# Patient Record
Sex: MALE | Race: ASIAN | NOT HISPANIC OR LATINO | Employment: FULL TIME | URBAN - METROPOLITAN AREA
[De-identification: names, ages, dates, MRNs, and addresses within clinical notes are randomized per-mention and may not be internally consistent; named-entity substitution may affect disease eponyms.]

---

## 2017-09-29 ENCOUNTER — COMMUNICATION - HEALTHEAST (OUTPATIENT)
Dept: FAMILY MEDICINE | Facility: CLINIC | Age: 27
End: 2017-09-29

## 2018-01-03 ASSESSMENT — MIFFLIN-ST. JEOR
SCORE: 1560.63
SCORE: 1560.63

## 2018-01-04 ENCOUNTER — SURGERY - HEALTHEAST (OUTPATIENT)
Dept: SURGERY | Facility: HOSPITAL | Age: 28
End: 2018-01-04

## 2018-01-04 ENCOUNTER — ANESTHESIA - HEALTHEAST (OUTPATIENT)
Dept: SURGERY | Facility: HOSPITAL | Age: 28
End: 2018-01-04

## 2018-01-04 ENCOUNTER — RECORDS - HEALTHEAST (OUTPATIENT)
Dept: SURGERY | Facility: HOSPITAL | Age: 28
End: 2018-01-04

## 2018-01-05 ASSESSMENT — MIFFLIN-ST. JEOR
SCORE: 1607.63
SCORE: 1594.03

## 2018-01-11 ENCOUNTER — OFFICE VISIT - HEALTHEAST (OUTPATIENT)
Dept: FAMILY MEDICINE | Facility: CLINIC | Age: 28
End: 2018-01-11

## 2018-01-11 DIAGNOSIS — Z23 NEED FOR IMMUNIZATION AGAINST INFLUENZA: ICD-10-CM

## 2018-01-11 DIAGNOSIS — Z87.09 HISTORY OF PERITONSILLAR ABSCESS: ICD-10-CM

## 2018-01-11 ASSESSMENT — MIFFLIN-ST. JEOR: SCORE: 1566.81

## 2019-02-19 ENCOUNTER — COMMUNICATION - HEALTHEAST (OUTPATIENT)
Dept: CARE COORDINATION | Facility: CLINIC | Age: 29
End: 2019-02-19

## 2019-02-22 ENCOUNTER — OFFICE VISIT - HEALTHEAST (OUTPATIENT)
Dept: FAMILY MEDICINE | Facility: CLINIC | Age: 29
End: 2019-02-22

## 2019-02-22 DIAGNOSIS — J36 PERITONSILLAR ABSCESS: ICD-10-CM

## 2019-02-22 DIAGNOSIS — Z87.09 HISTORY OF PERITONSILLAR ABSCESS: ICD-10-CM

## 2019-02-22 DIAGNOSIS — Z09 HOSPITAL DISCHARGE FOLLOW-UP: ICD-10-CM

## 2019-02-22 DIAGNOSIS — Z23 NEED FOR IMMUNIZATION AGAINST INFLUENZA: ICD-10-CM

## 2019-02-22 ASSESSMENT — MIFFLIN-ST. JEOR: SCORE: 1464.75

## 2019-05-20 ENCOUNTER — COMMUNICATION - HEALTHEAST (OUTPATIENT)
Dept: FAMILY MEDICINE | Facility: CLINIC | Age: 29
End: 2019-05-20

## 2019-05-20 DIAGNOSIS — Z20.2 TRICHOMONAS CONTACT: ICD-10-CM

## 2019-05-22 ENCOUNTER — AMBULATORY - HEALTHEAST (OUTPATIENT)
Dept: NURSING | Facility: CLINIC | Age: 29
End: 2019-05-22

## 2019-05-22 ENCOUNTER — AMBULATORY - HEALTHEAST (OUTPATIENT)
Dept: FAMILY MEDICINE | Facility: CLINIC | Age: 29
End: 2019-05-22

## 2019-05-22 DIAGNOSIS — Z20.2 GONORRHEA CONTACT: ICD-10-CM

## 2019-05-22 DIAGNOSIS — Z20.2 CHLAMYDIA CONTACT: ICD-10-CM

## 2019-06-26 ENCOUNTER — OFFICE VISIT - HEALTHEAST (OUTPATIENT)
Dept: FAMILY MEDICINE | Facility: CLINIC | Age: 29
End: 2019-06-26

## 2019-06-26 DIAGNOSIS — Z20.2 EXPOSURE TO STD: ICD-10-CM

## 2019-06-26 DIAGNOSIS — Z87.898 HISTORY OF ALCOHOL USE: ICD-10-CM

## 2019-06-26 LAB
ALBUMIN SERPL-MCNC: 3.8 G/DL (ref 3.5–5)
ALP SERPL-CCNC: 106 U/L (ref 45–120)
ALT SERPL W P-5'-P-CCNC: 25 U/L (ref 0–45)
AST SERPL W P-5'-P-CCNC: 14 U/L (ref 0–40)
BILIRUB DIRECT SERPL-MCNC: 0.1 MG/DL
BILIRUB SERPL-MCNC: 0.3 MG/DL (ref 0–1)
HIV 1+2 AB+HIV1 P24 AG SERPL QL IA: NEGATIVE
PROT SERPL-MCNC: 7.6 G/DL (ref 6–8)

## 2019-06-26 ASSESSMENT — MIFFLIN-ST. JEOR: SCORE: 1510.11

## 2019-06-27 LAB
C TRACH DNA SPEC QL PROBE+SIG AMP: NEGATIVE
N GONORRHOEA DNA SPEC QL NAA+PROBE: NEGATIVE
T PALLIDUM AB SER QL: NEGATIVE

## 2019-07-10 ENCOUNTER — COMMUNICATION - HEALTHEAST (OUTPATIENT)
Dept: FAMILY MEDICINE | Facility: CLINIC | Age: 29
End: 2019-07-10

## 2019-07-10 DIAGNOSIS — Z20.2 CHLAMYDIA CONTACT: ICD-10-CM

## 2019-09-13 ENCOUNTER — OFFICE VISIT - HEALTHEAST (OUTPATIENT)
Dept: ADDICTION MEDICINE | Facility: HOSPITAL | Age: 29
End: 2019-09-13

## 2019-09-13 DIAGNOSIS — F15.20 METHAMPHETAMINE USE DISORDER, MODERATE (H): ICD-10-CM

## 2019-09-23 ENCOUNTER — OFFICE VISIT - HEALTHEAST (OUTPATIENT)
Dept: ADDICTION MEDICINE | Facility: HOSPITAL | Age: 29
End: 2019-09-23

## 2019-09-23 DIAGNOSIS — F15.20 METHAMPHETAMINE USE DISORDER, MODERATE (H): ICD-10-CM

## 2019-09-24 ENCOUNTER — OFFICE VISIT - HEALTHEAST (OUTPATIENT)
Dept: ADDICTION MEDICINE | Facility: HOSPITAL | Age: 29
End: 2019-09-24

## 2019-09-24 DIAGNOSIS — F15.20 METHAMPHETAMINE USE DISORDER, MODERATE (H): ICD-10-CM

## 2019-09-25 ENCOUNTER — AMBULATORY - HEALTHEAST (OUTPATIENT)
Dept: ADDICTION MEDICINE | Facility: HOSPITAL | Age: 29
End: 2019-09-25

## 2019-09-25 DIAGNOSIS — F15.20 METHAMPHETAMINE USE DISORDER, MODERATE (H): ICD-10-CM

## 2019-09-27 ENCOUNTER — AMBULATORY - HEALTHEAST (OUTPATIENT)
Dept: ADDICTION MEDICINE | Facility: HOSPITAL | Age: 29
End: 2019-09-27

## 2019-10-08 ENCOUNTER — AMBULATORY - HEALTHEAST (OUTPATIENT)
Dept: LAB | Facility: HOSPITAL | Age: 29
End: 2019-10-08

## 2019-10-08 ENCOUNTER — OFFICE VISIT - HEALTHEAST (OUTPATIENT)
Dept: ADDICTION MEDICINE | Facility: HOSPITAL | Age: 29
End: 2019-10-08

## 2019-10-08 DIAGNOSIS — F15.20 METHAMPHETAMINE USE DISORDER, MODERATE (H): ICD-10-CM

## 2019-10-08 LAB
AMPHETAMINES UR QL SCN: ABNORMAL
BARBITURATES UR QL: ABNORMAL
BENZODIAZ UR QL: ABNORMAL
CANNABINOIDS UR QL SCN: ABNORMAL
COCAINE UR QL: ABNORMAL
CREAT UR-MCNC: 5.7 MG/DL
ETHANOL UR CFM-MCNC: <10 MG/DL
METHADONE UR QL SCN: ABNORMAL
OPIATES UR QL SCN: ABNORMAL
OXYCODONE UR QL: ABNORMAL
PCP UR QL SCN: ABNORMAL

## 2019-10-10 ENCOUNTER — OFFICE VISIT - HEALTHEAST (OUTPATIENT)
Dept: ADDICTION MEDICINE | Facility: HOSPITAL | Age: 29
End: 2019-10-10

## 2019-10-10 DIAGNOSIS — F15.20 METHAMPHETAMINE USE DISORDER, MODERATE (H): ICD-10-CM

## 2019-10-11 ENCOUNTER — AMBULATORY - HEALTHEAST (OUTPATIENT)
Dept: ADDICTION MEDICINE | Facility: HOSPITAL | Age: 29
End: 2019-10-11

## 2019-10-12 LAB
AMPHET UR-MCNC: 280 NG/ML
MDA UR-MCNC: <200 NG/ML
MDEA UR-MCNC: <200 NG/ML
MDMA UR-MCNC: <200 NG/ML
METHAMPHET UR-MCNC: 2348 NG/ML
PHENTERMINE UR CFM-MCNC: <200 NG/ML

## 2019-10-15 ENCOUNTER — OFFICE VISIT - HEALTHEAST (OUTPATIENT)
Dept: ADDICTION MEDICINE | Facility: HOSPITAL | Age: 29
End: 2019-10-15

## 2019-10-15 DIAGNOSIS — F15.20 METHAMPHETAMINE USE DISORDER, MODERATE (H): ICD-10-CM

## 2019-10-17 ENCOUNTER — OFFICE VISIT - HEALTHEAST (OUTPATIENT)
Dept: ADDICTION MEDICINE | Facility: HOSPITAL | Age: 29
End: 2019-10-17

## 2019-10-17 ENCOUNTER — AMBULATORY - HEALTHEAST (OUTPATIENT)
Dept: ADDICTION MEDICINE | Facility: HOSPITAL | Age: 29
End: 2019-10-17

## 2019-10-17 DIAGNOSIS — F15.20 METHAMPHETAMINE USE DISORDER, MODERATE (H): ICD-10-CM

## 2019-10-22 ENCOUNTER — OFFICE VISIT - HEALTHEAST (OUTPATIENT)
Dept: ADDICTION MEDICINE | Facility: HOSPITAL | Age: 29
End: 2019-10-22

## 2019-10-22 DIAGNOSIS — F15.20 METHAMPHETAMINE USE DISORDER, MODERATE (H): ICD-10-CM

## 2019-10-25 ENCOUNTER — AMBULATORY - HEALTHEAST (OUTPATIENT)
Dept: ADDICTION MEDICINE | Facility: HOSPITAL | Age: 29
End: 2019-10-25

## 2019-10-29 ENCOUNTER — OFFICE VISIT - HEALTHEAST (OUTPATIENT)
Dept: ADDICTION MEDICINE | Facility: HOSPITAL | Age: 29
End: 2019-10-29

## 2019-10-29 DIAGNOSIS — F15.20 METHAMPHETAMINE USE DISORDER, MODERATE (H): ICD-10-CM

## 2019-10-31 ENCOUNTER — OFFICE VISIT - HEALTHEAST (OUTPATIENT)
Dept: ADDICTION MEDICINE | Facility: HOSPITAL | Age: 29
End: 2019-10-31

## 2019-10-31 ENCOUNTER — AMBULATORY - HEALTHEAST (OUTPATIENT)
Dept: LAB | Facility: HOSPITAL | Age: 29
End: 2019-10-31

## 2019-10-31 DIAGNOSIS — F15.20 METHAMPHETAMINE USE DISORDER, MODERATE (H): ICD-10-CM

## 2019-11-01 ENCOUNTER — AMBULATORY - HEALTHEAST (OUTPATIENT)
Dept: ADDICTION MEDICINE | Facility: HOSPITAL | Age: 29
End: 2019-11-01

## 2019-11-12 ENCOUNTER — OFFICE VISIT - HEALTHEAST (OUTPATIENT)
Dept: ADDICTION MEDICINE | Facility: HOSPITAL | Age: 29
End: 2019-11-12

## 2019-11-12 DIAGNOSIS — F15.20 METHAMPHETAMINE USE DISORDER, MODERATE (H): ICD-10-CM

## 2019-11-14 ENCOUNTER — OFFICE VISIT - HEALTHEAST (OUTPATIENT)
Dept: ADDICTION MEDICINE | Facility: HOSPITAL | Age: 29
End: 2019-11-14

## 2019-11-14 DIAGNOSIS — F15.20 METHAMPHETAMINE USE DISORDER, MODERATE (H): ICD-10-CM

## 2019-11-14 LAB
AMPHETAMINES UR QL SCN: ABNORMAL
BARBITURATES UR QL: ABNORMAL
BENZODIAZ UR QL: ABNORMAL
CANNABINOIDS UR QL SCN: ABNORMAL
COCAINE UR QL: ABNORMAL
CREAT UR-MCNC: 30.8 MG/DL
ETHANOL UR CFM-MCNC: <10 MG/DL
METHADONE UR QL SCN: ABNORMAL
OPIATES UR QL SCN: ABNORMAL
OXYCODONE UR QL: ABNORMAL
PCP UR QL SCN: ABNORMAL

## 2019-11-15 ENCOUNTER — AMBULATORY - HEALTHEAST (OUTPATIENT)
Dept: ADDICTION MEDICINE | Facility: HOSPITAL | Age: 29
End: 2019-11-15

## 2019-11-19 ENCOUNTER — OFFICE VISIT - HEALTHEAST (OUTPATIENT)
Dept: ADDICTION MEDICINE | Facility: HOSPITAL | Age: 29
End: 2019-11-19

## 2019-11-19 ENCOUNTER — AMBULATORY - HEALTHEAST (OUTPATIENT)
Dept: LAB | Facility: HOSPITAL | Age: 29
End: 2019-11-19

## 2019-11-19 DIAGNOSIS — M35.4: ICD-10-CM

## 2019-11-19 DIAGNOSIS — F15.20 METHAMPHETAMINE USE DISORDER, MODERATE (H): ICD-10-CM

## 2019-11-19 DIAGNOSIS — D72.18: ICD-10-CM

## 2019-11-19 LAB
AMPHETAMINES UR QL SCN: ABNORMAL
BARBITURATES UR QL: ABNORMAL
BENZODIAZ UR QL: ABNORMAL
CANNABINOIDS UR QL SCN: ABNORMAL
COCAINE UR QL: ABNORMAL
CREAT UR-MCNC: 14.9 MG/DL
ETHANOL UR CFM-MCNC: <10 MG/DL
METHADONE UR QL SCN: ABNORMAL
OPIATES UR QL SCN: ABNORMAL
OXYCODONE UR QL: ABNORMAL
PCP UR QL SCN: ABNORMAL

## 2019-11-22 ENCOUNTER — AMBULATORY - HEALTHEAST (OUTPATIENT)
Dept: ADDICTION MEDICINE | Facility: HOSPITAL | Age: 29
End: 2019-11-22

## 2019-11-24 LAB
AMPHET UR-MCNC: 487 NG/ML
MDA UR-MCNC: <200 NG/ML
MDEA UR-MCNC: <200 NG/ML
MDMA UR-MCNC: <200 NG/ML
METHAMPHET UR-MCNC: 2433 NG/ML
PHENTERMINE UR CFM-MCNC: <200 NG/ML

## 2019-11-26 ENCOUNTER — OFFICE VISIT - HEALTHEAST (OUTPATIENT)
Dept: ADDICTION MEDICINE | Facility: HOSPITAL | Age: 29
End: 2019-11-26

## 2019-11-26 DIAGNOSIS — F15.20 METHAMPHETAMINE USE DISORDER, MODERATE (H): ICD-10-CM

## 2019-11-27 ENCOUNTER — AMBULATORY - HEALTHEAST (OUTPATIENT)
Dept: ADDICTION MEDICINE | Facility: HOSPITAL | Age: 29
End: 2019-11-27

## 2019-12-03 ENCOUNTER — OFFICE VISIT - HEALTHEAST (OUTPATIENT)
Dept: ADDICTION MEDICINE | Facility: HOSPITAL | Age: 29
End: 2019-12-03

## 2019-12-03 DIAGNOSIS — F15.20 METHAMPHETAMINE USE DISORDER, MODERATE (H): ICD-10-CM

## 2019-12-05 ENCOUNTER — AMBULATORY - HEALTHEAST (OUTPATIENT)
Dept: ADDICTION MEDICINE | Facility: HOSPITAL | Age: 29
End: 2019-12-05

## 2019-12-12 ENCOUNTER — OFFICE VISIT - HEALTHEAST (OUTPATIENT)
Dept: ADDICTION MEDICINE | Facility: HOSPITAL | Age: 29
End: 2019-12-12

## 2019-12-12 DIAGNOSIS — F15.20 METHAMPHETAMINE USE DISORDER, MODERATE (H): ICD-10-CM

## 2019-12-13 ENCOUNTER — AMBULATORY - HEALTHEAST (OUTPATIENT)
Dept: ADDICTION MEDICINE | Facility: HOSPITAL | Age: 29
End: 2019-12-13

## 2019-12-19 ENCOUNTER — OFFICE VISIT - HEALTHEAST (OUTPATIENT)
Dept: ADDICTION MEDICINE | Facility: HOSPITAL | Age: 29
End: 2019-12-19

## 2019-12-19 DIAGNOSIS — F15.20 METHAMPHETAMINE USE DISORDER, MODERATE (H): ICD-10-CM

## 2019-12-31 ENCOUNTER — OFFICE VISIT - HEALTHEAST (OUTPATIENT)
Dept: ADDICTION MEDICINE | Facility: HOSPITAL | Age: 29
End: 2019-12-31

## 2019-12-31 ENCOUNTER — AMBULATORY - HEALTHEAST (OUTPATIENT)
Dept: LAB | Facility: HOSPITAL | Age: 29
End: 2019-12-31

## 2019-12-31 DIAGNOSIS — F15.20 METHAMPHETAMINE USE DISORDER, MODERATE (H): ICD-10-CM

## 2019-12-31 LAB
AMPHETAMINES UR QL SCN: ABNORMAL
BARBITURATES UR QL: ABNORMAL
BENZODIAZ UR QL: ABNORMAL
CANNABINOIDS UR QL SCN: ABNORMAL
COCAINE UR QL: ABNORMAL
CREAT UR-MCNC: 8.5 MG/DL
ETHANOL UR CFM-MCNC: <10 MG/DL
METHADONE UR QL SCN: ABNORMAL
OPIATES UR QL SCN: ABNORMAL
OXYCODONE UR QL: ABNORMAL
PCP UR QL SCN: ABNORMAL

## 2020-01-03 ENCOUNTER — AMBULATORY - HEALTHEAST (OUTPATIENT)
Dept: ADDICTION MEDICINE | Facility: HOSPITAL | Age: 30
End: 2020-01-03

## 2020-01-05 LAB
AMPHET UR-MCNC: 414 NG/ML
MDA UR-MCNC: <200 NG/ML
MDEA UR-MCNC: <200 NG/ML
MDMA UR-MCNC: <200 NG/ML
METHAMPHET UR-MCNC: 4113 NG/ML
PHENTERMINE UR CFM-MCNC: <200 NG/ML

## 2020-01-14 ENCOUNTER — AMBULATORY - HEALTHEAST (OUTPATIENT)
Dept: ADDICTION MEDICINE | Facility: HOSPITAL | Age: 30
End: 2020-01-14

## 2021-05-28 NOTE — TELEPHONE ENCOUNTER
I will send medication to pharmacy for patient. He does not need to be seen in clinic.    Leona John MD

## 2021-05-28 NOTE — TELEPHONE ENCOUNTER
Patient is scheduled to see Dr. Dietrich 5/22/2019 for positive trichomonas result in spouse (Vinay Holbrook). Patient was notified that he would need to be treated for Trich as well as his female partner. Dr. Samuel sent medication for spouse today to [facility]. Can MD send medication for Pah without the patient having to come on 5/22/19? Thank you.     If MD will send without seeing patient, please send medication for Trichomonas to [facility].

## 2021-05-30 NOTE — PROGRESS NOTES
"SUBJECTIVE  Pah SUZE Tatum is a 29 y.o. male here for:    Chief Complaint   Patient presents with     Follow-up     STD     His wife tested positive for trichomonas, gonorrhea and chlamydia. He was treated with flagyl, azithromycin and IM ceftriaxone 1 month ago. His wife was also treated. He is here today for recheck. He has not had intercourse with anyone since 1 month ago after he was treated. He denies any dysuria, hematuria, penile discharge or lesions.     Hx of heavy alcohol use previously- he reports he only drinks a few beers 1-2 x per month. Denies withdrawal symptoms. Denies other drug use.    ROS  Complete 10 point review of systems negative except as noted above in HPI    Reviewed Past Medical History, Medications, Family History and Social History in Epic and up to date with no new changes.    OBJECTIVE  /84   Pulse (!) 112   Temp 98  F (36.7  C) (Oral)   Resp 16   Ht 5' 6\" (1.676 m)   Wt 135 lb (61.2 kg)   SpO2 99%   BMI 21.79 kg/m       General: Cooperative, pleasant, in no acute distress  HEENT: NCAT, sclera clear  CV: RRR, normal S1/S2, no murmur, rubs, gallops  Resp: No respiratory distress. Clear to auscultation bilaterally. No wheezes, rales, rhonchi  Abd: Soft, non-tender, non-distended, BS normal. No hepatosplenomegaly  Psych: Normal mood and affect  : deferred per patient request.    LABS & IMAGES   Pending at time of note    ASSESSMENT/PLAN:   [place] was seen today for follow-up.    Diagnoses and all orders for this visit:    Exposure to STD: +chlamydia, +gonorrhea, +trich in his partner- he has been treated 1 month ago and denies intercourse since that time. Recheck today. Also will check HIV and syphilis. Discussed safe sex practices.  -     Chlamydia trachomatis & Neisseria gonorrhoeae, Amplified Detection  -     HIV Antigen/Antibody Screening [facility]  -     Syphilis Screen, [facility]    History of alcohol use: Previous heavy use- denies any heavy use currently. 1-2 beers about 1-2 " time per month. Denies other drug use. Denies any withdrawal symptoms. Will check baseline hepatic profile.  -     Hepatic Profile          Visit was completed along with Elva     Options for treatment and follow-up care were reviewed with the patient. Pah T Rc and/or guardian was engaged and actively involved in the decision making process. Pah T Rc and/or guardian verbalized understanding of the options discussed and was satisfied with the final plan.      Leona Samuel MD

## 2021-05-30 NOTE — TELEPHONE ENCOUNTER
The patient's partner tested positive for Chlamydia. Dr. Smith recommending treatment for Pah as well.     CAYDEN spoke with Pah's partner and she is requesting that MD send medication for him to [facility] on [address]. She does not want us to call him, but states that she would like to inform him personally and  medication together.     Please note that patient's preferred pharmacy is [facility]. Please send to [facility] on [address].

## 2021-05-31 VITALS — WEIGHT: 150 LBS | BODY MASS INDEX: 23.54 KG/M2 | HEIGHT: 67 IN

## 2021-05-31 VITALS — WEIGHT: 144 LBS | BODY MASS INDEX: 22.6 KG/M2 | HEIGHT: 67 IN

## 2021-06-01 NOTE — PROGRESS NOTES
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 (F18.20) (304.60)   []  Opioid Use Disorder [] Mild  [] Moderate  [] Severe [] (F11.10) (305.50)  [] (F11.20) (304.00)  [] (F11.20) (304.00)   []  Sedative, Hypnotic, or Anxiolytic Use Disorder [] Mild  [] Moderate  [] Severe [] (F13.10) (305.40)   [] (F13.20) (304.10)  [] (F13.20) (304.10)   [x]  Stimulant Related Disorders [] Mild  [x] Moderate  [] Severe [] (F15.10) (305.70) Amphetamine type substance  [] (F14.10) (305.60) Cocaine  [] (F15.10) (305.70) Other or unspecified stimulant  [x] (F15.20) (304.40) Amphetamine type substance  [] (F14.20) (304.20) Cocaine  [] (F15.20) (304.40) Other or unspecified stimulant  [] (F15.20) (304.40) Amphetamine type substance  [] (F14.20) (304.20) Cocaine  [] (F15.20) (304.40) Other or unspecified stimulant   []  Tobacco use Disorder [] Mild  [] Moderate  [] Severe [] (Z72.0) (305.1)  [] (F17.200) (305.1)  [] (F17.200) (305.1)   []  Other (or unknown) Substance Use Disorder [] Mild  [] Moderate  [] Severe [] (F19.10) (305.90)  [] (F19.20) (304.90)  [] (F19.20) (304.90)   /  Suggested Level of Care Necessary for Recovery  []  Inpatient  []  Extended Care []  Residential [x]  Outpatient  []  None    Collateral Contact Summary   Number of contacts made:  2  Contact with referring  person:  yes     If court related records were reviewed, summarize here:  ---   []   Information from collateral contacts supported/largely agreed with information from the client and associated risk ratings.   [x]   Information from collateral contacts was significantly different from information from the client and lead to different risk ratings.    Summarize new information here: Client has a violation hearing pending (11/01/19) related to current probation for drug possession in [facility] and has charges pending in [facility] for Prostitution and Felony Drug Possession with a trial date of 10/28/19..    Rule 25 Assessment Summary and Plan   's Recommendation    Based on the information gathered in this assessment and from collateral information, Client meets Criteria for Stimulant Use Disorder, moderate amphetamine type substance (F15.20)  -Outpatient treatment program.  -Follow recommendations of treatment program.  -Abstain from use of mood altering substances.  -Follow all requirements of probation.  -Remain law abiding.  This assessment can be updated with additional information within a 6 month period.      Collateral Contacts      Name    Jeffypaulie Wright Relationship     Phone Number    [phone] Releases    yes       Information Provided:    Left Message requesting a call back on 9/16 @ 0900, 9/18 @ 1045.  Received voicemail from  on 9/19/19: Client has a violation hearing pending (11/01/19) related to current probation for drug possession in [facility] and has charges pending in [facility] for Pro[facility] and Felony Drug Possession with a trial date of 10/28/19.    Collateral Contacts     Name    Neto Shook    [facility] Health Educator Phone Number    [phone] Releases    yes       Information Provided:    Verified information provided by the client during the assessment interview.     Staff Name and Title: Noah Fierro,  [facility]  Date:  9/13/2019  Time:  9:46 AM

## 2021-06-01 NOTE — PROGRESS NOTES
Weekly Progress Note  Rc[place]ia  1990  [id number]     D) Pt attended 2 groups this week with 0 absences. Patient attended 0 individual sessions this week. A) Staff facilitated groups and reviewed tx progress. Assessed for [facility]. R) No VAP needed at this time.   Any significant events, defines as events that impact patient s relationship with others inside and outside of treatment: No  Indicate any changes or monitoring of physical or mental health problems No     Indicate involvement by any outside supports No  IAPP reviewed and modified as needed. NA  Pt working on the following dimensions:     Dimension #1 - Withdrawal Potential - Risk 0. Client did not provide a definite last date of use. No signs or symptoms of intoxication or withdrawal noted or reported.   Specific goals from treatment plan addressed this week:  Patient to maintain abstinence throughout outpatient treatment.   Effectiveness of strategies:  Progressing: No Changes Noted.     Dimension #2 - Biomedical - Risk 0. The client reports he has no medical needs unmet and is able to obtain any necessary care on his own.   Specific goals from treatment plan addressed this week:  Patient to maintain stable health throughout outpatient treatment.  Effectiveness of strategies:  Progressing: Patient reports no issues in this area this week.     Dimension #3 - Emotional/Behavioral/Cognitive - Risk 0. No emotional or mental health problems noted or reported.     Specific goals from treatment plan addressed this week:  N/A  Effectiveness of strategies:  N/A     Dimension #4 - Treatment Acceptance/Resistance - Risk 2. The Client appears to lack insight into his substance use problems due in part to cultural barriers that interfere with his understanding of the need for treatment.   Specific goals from treatment plan addressed this week:  Client will increase motivation towards recovery by participating in outpatient programming.   Effectiveness of  "strategies:  Progressing: Patient attended all groups this week and participated in group discussion without prompting. Client stated during check in that he is sober today because he wants to be healthier and that he was grateful to be feeling better since he stopped drinking.     Dimension #5 - Relapse Potential - Risk 3. The client did not demonstrate any awareness of situations or emotions that trigger his drinking or display knowledge or understanding of the skills or coping mechanisms necessary to avoid those triggers.   Specific goals from treatment plan addressed this week:  Client will gain understanding and insight into situations and emotions that trigger his substance use.  Effectiveness of strategies:  Progressing: Client stated during check-in that he had experienced no thoughts or cravings to drink this week.     Dimension #6 - Recovery Environment - Risk 3. Client is not currently attending any community based recovery support groups and did not identify any reliable means of recovery support.  Specific goals from treatment plan addressed this week:  Client will explore and identify healthy sober supports in his community.  Effectiveness of strategies:  Progressing: Client did not attend the [facility] Community Support group last Saturday because he was not familiar with it. Client was given resources and information regarding the meeting and rides to and from.    T) Treatment plan updated: No. Patient notified and in agreement: NA.  Patient educated on \"Group Norms and Treatment Orientation  and  Reviewing Our Life Experiences . Client has completed 8 of 110 program hours at this time. Projected discharge date is 03/11/2020. Current discharge plan is not yet developed.     ALIA Zamarripa  9/27/2019, 12:03 PM    "

## 2021-06-01 NOTE — PROGRESS NOTES
Addiction Services - Initial Services Plan     Patient  Name: Virginia Tatum  MRN: [id number]   : 1990  Admit Date: 2019      Client describes their immediate need:  To learn recovery skills to prevent relapse. Comply with Probation    Are there any immediate Safety Needs such as (physical, stability, mobility): Client states he is able to get medical care as needed and denies any immediate concerns.     Immediate Health Needs and Plan:   Remain abstinent from substance use and attend first group therapy session on     Vulnerable Adult: No     Issues to be addressed in the first sessions:   Treatment planning, orientation to group norms and rules, and welcomed by peers.     Client strengths and needs:   Strengths identified as: Being a good .  Needs identified as: ESL classes, Service Coordination, comply with probation, monitor insurance.    Plan for patient for time between intake and completion of the treatment plan:   Attend all group therapy sessions as directed, complete all written and oral assignments as directed, and remain clean and sober. A relapse, if any, must be reported to staff immediately in order to ensure you are receiving the proper level of care. If you cannot attend a group therapy session you must call contact information provided in intake folder and leave a message before or during group hours.     Vulnerable Adult Review   [X] Review of the Facility Abuse Prevention plan was reviewed with the patient   [X] No Individual Abuse Plan is necessary   [ ] In addition to the Facility Abuse Prevention plan, an Individual Abuse Plan will be put in place     Staff Name/Title: ALIA Zamarripa   Date: 2019 Time: 1:00 PM

## 2021-06-01 NOTE — PROGRESS NOTES
Individual Treatment Plan    Patient  Name: Virginia Tatum  MRN: [id number]   : 1990  Admit Date: 19  Date of Initial Service Plan: 19  Tentative Discharge Date: 2020    Diagnosis: Stimulant Use Disorder, moderate amphetamine type substance (F15.20)    Counselor: ALIA Zamarripa  Dimension 1: Acute Intoxication/Withdrawal Potential, Risk level: 0    Problem Statement from Comprehensive Assessment:  Client did not provide a last date of use of alcohol, stating he could not remember. Client denied any history of methamphetamine use, but collateral sources indicate the client has a legal history of meth possession, including a pending felony possession charge. No signs or symptoms of intoxication or withdrawal noted or reported.   Problem: No signs or symptoms of intoxication or withdrawal noted or reported.   Goal: Patient to maintain abstinence throughout outpatient treatment.   Must be reached to complete treatment? Yes  Methods/Strategies (must include amount and frequency):   1. Patient to report any substance/alcohol use to counselor to determine if any changes need to be made to address withdrawal symptoms.   2. Patient to complete any requested UAs.   Target Date: 2020  Completion Date:       Dimension 2: Biomedical Conditions/Complications, Risk level: 0    Problem Statement from Comprehensive Assessment:  The client reports he has no medical needs unmet and is able to obtain any necessary care on his own.   Problem: The client reports he has no medical needs unmet and is able to obtain any necessary care on his own.  Goal: Patient to maintain stable health throughout outpatient treatment.   Must be reached to complete treatment? No  Methods/Strategies (must include amount and frequency):   1. Patient to report any changes to physical health to counselor.   2. Patient to attend all scheduled doctor s appointments.   3. Patient to take medications as prescribed.   Target Date:  03/11/2020  Completion Date:       Dimension 3: Emotional/Behavioral/Cognitive, Risk level: 0    Problem Statement from Comprehensive Assessment:  No emotional or mental health problems noted or reported. Client arrived to the [place] as a refugee in [place] and is currently a green card lieber[place] but speaks and understands limited English and is still in the process of acculturating.  Problem: No emotional or mental health problems noted or reported.    Goal: No Plan Needed at this time  Must be reached to complete treatment? N/A  Methods/Strategies (must include amount and frequency): N/A  Target Date: N/A  Completion Date:       Dimension 4: Readiness to Change, Risk level 2    Problem Statement from Comprehensive Assessment:  Client does not identify with having a substance use problem but did express willingness to attend treatment. Client provided information during his assessment interview that was markedly different from information obtained from collateral sources. The Client appears to lack insight into his substance use problems due in part to cultural barriers that interfere with his understanding of the need for treatment.   Problem: The Client appears to lack insight into his substance use problems due in part to cultural barriers that interfere with his understanding of the need for treatment.  Goal: Patient to increase motivation towards recovery by participating in outpatient programming.   Must be reached to complete treatment? Yes  Methods/Strategies (must include amount and frequency):   1. Patient to attend 2, 3-hour groups per week and all scheduled 1:1s.  2. Patient will contact staff before start time if unable to attend any scheduled group or appointment  Target Date: 03/11/2020  Completion Date:       Dimension 5: Relapse/Continued Use/Continued Problem Potential, Risk level: 3    Problem Statement from Comprehensive Assessment:  The client did not demonstrate any awareness of situations or emotions  that trigger his substance use or display any knowledge or understanding of the skills or coping mechanisms necessary to avoid those triggers. The Client lacks awareness and/or understanding of western concepts regarding substance use disorders due to language and cultural barriers.  Problem: The client did not demonstrate any awareness of situations or emotions that trigger his substance use or display any knowledge or understanding of the skills or coping mechanisms necessary to avoid those triggers.   Goal: To gain understanding of relapse triggers and stressors while learning coping skills in order to handle life events without resorting to substance use.  Must be reached to complete treatment? Yes  Methods/Strategies (must include amount and frequency):   Patient to share in daily check-in any urges and addictive thinking to better understand his pattern of use and to prevent relapse in the future.   Target Date: 03/11/2020  Completion Date:     Dimension 6: Recovery Environment, Risk level: 3    Problem Statement from Comprehensive Assessment:  The client stated he is Not Currently Employed or in any training program. The client is not currently attending any community based recovery support groups and did not identify any reliable means of recovery support. Client has a violation hearing pending related to current probation for drug possession in [facility] and has charges pending in [facility] for Prostitution and Felony Drug Possession.   Problem: Client is not currently attending any community based recovery support groups and did not identify any reliable means of recovery support.  Goal: Client will explore and identify healthy recovery supports in his community.  Must be reached to complete treatment? Yes  Methods/Strategies (must include amount and frequency): Client will attend 1 [facility] Community Support Group meeting per week (as able and available) and report back to counselor and  group on his experiences.  Target Date: 03/11/2020  Completion Date:     Resources  Resources to which the patient is being referred for problems when problems are to be addressed concurrently by another provider: None  By signing this document, I am acknowledging that I was actively and directly involved in the development of my treatment plan.     Patient  Signature_________________________________________         Date__________________     Staff Signature  [facility]    Date: 9/25/2019, 11:24 AM

## 2021-06-01 NOTE — PROGRESS NOTES
"Intake Note:     D) Virginia Tatum is a 29 y.o.  single  male who is referred to [facility] via [employer]OBATION with funding from [employer]. Patient orientated x 3. Patient meets criteria for Stimulant Use Disorder, moderate amphetamine type substance (F15.20).  Patient appears appropriate for OUTPATIENT TREATMENT.   A) Met with patient for 60 minutes.  Completed intake assessment and preliminary paperwork. Patient was given and explained counselor & supervisor license number and contact info, Patient Bill of Rights, program rules/regulations, Program Abuse Prevention Plan, confidentiality & HIPPA policies, grievance procedure, presented ROIs, TB & HIV/AIDS policies & resources, and Vulnerable Adult policy.   Conducted Vulnerable Adult Assessment.   R)No special Vulnerable Adult needed at this time.  Patient signed and agreed to counselor & supervisor license number and contact info, Patient Bill of Rights, group rules/regulations, Program Abuse Prevention Plan, confidentiality & HIPPA policies, grievance procedure,  ROIs, TB & HIV/AIDS policies & resources, and Vulnerable Adult policy. Patient scored LOW RISK on C-SSRS screen. Patient denied suicidal ideation/intent/plan/means at this time.     Opioid Use Disorder: No   Provided \"Options for Opioid Treatment in [place] and Overdose Prevention\" No     Dimension #1 - Withdrawal Potential - Risk 0. Client did not provide a last date of use of alcohol, stating he could not remember. Client denied any history of methamphetamine use, but collateral sources indicate the client has a legal history of meth possession, including a pending felony possession charge. No signs or symptoms of intoxication or withdrawal noted or reported.  Dimension #2 - Biomedical - Risk 0. The client reports he has no medical needs unmet and is able to obtain any necessary care on his own.   Dimension #3 - Emotional , Behavioral and Cognitive - Risk 0. No emotional or mental health " problems noted or reported.   Dimension #4 - Readiness for Change - Risk 2. Client does not identify with having a substance use problem but did express willingness to attend treatment. Client provided information during his assessment interview that was markedly different from information obtained from collateral sources. The Client appears to lack insight into his substance use problems due in part to cultural barriers that interfere with his understanding of the need for treatment.  Dimension #5 - Relapse Potential - Risk 3. The client did not demonstrate any awareness of situations or emotions that trigger his substance use or display any knowledge or understanding of the skills or coping mechanisms necessary to avoid those triggers. The Client lacks awareness and/or understanding of western concepts regarding substance use disorders due to language and cultural barriers.  Dimension #6 - Recovery Environment - Risk 3. The client stated he is Not Currently Employed or in any training program. The client is not currently attending any community based recovery support groups and did not identify any reliable means of recovery support. Client has a violation hearing pending related to current probation for drug possession in [facility] and has charges pending in [facility] for Pro[facility] and Felony Drug Possession.    T) Explained counselor & supervisor license number and contact info, Patient Bill of Rights, program rules/regulations, Program Abuse Prevention Plan, confidentiality & HIPPA policies, grievance procedure, presented ROIs, TB & HIV/AIDS policies & resources, and Vulnerable Adult policy. Patient expected to start group on 09/23/2019.    ALIA Zamarripa  9/24/2019, 12:17 PM

## 2021-06-01 NOTE — PROGRESS NOTES
Virginia Tatum attended 3 hours of group today.     The group topic was Reviewing Our Life Experiences, patient was responsive to topic.     Patients engagement in the group session: medium     Total group size: 7    ALIA Zamarripa  9/24/2019, 3:48 PM

## 2021-06-01 NOTE — PROGRESS NOTES
Virginia Tatum attended 4 hours of group today.     The group topic was group norms and treatment orientation, patient was responsive to topic.     Patients engagement in the group session: medium     Total group size: 3      ALIA Zamarripa  9/23/2019, 3:51 PM

## 2021-06-02 VITALS — WEIGHT: 125 LBS | HEIGHT: 66 IN | BODY MASS INDEX: 20.09 KG/M2

## 2021-06-02 NOTE — PROGRESS NOTES
Virginia Tatum attended 3 hours of group today.     The group topic was Goals and Recovery Part Two, patient was responsive to topic.     Patient's engagement in the group session: medium     Total group size: 6      ALIA Zamarripa  10/10/2019, 3:46 PM

## 2021-06-02 NOTE — PROGRESS NOTES
Weekly Progress Note  RcVirginia  1990  [id number]     D) Pt attended 2 groups this week with 0 absences. Patient attended 0 individual sessions this week. A) Staff facilitated groups and reviewed tx progress. Assessed for VA. R) No VAP needed at this time.   Any significant events, defines as events that impact patient s relationship with others inside and outside of treatment: No  Indicate any changes or monitoring of physical or mental health problems No     Indicate involvement by any outside supports No  IAPP reviewed and modified as needed. NA  Pt working on the following dimensions:     Dimension #1 - Withdrawal Potential - Risk 0. Client stated his last date of use as 10/05/19. No signs or symptoms of intoxication or withdrawal noted or reported.   Specific goals from treatment plan addressed this week:  Patient to maintain abstinence throughout outpatient treatment.   Effectiveness of strategies:  Progressing: Client reported his last date of use of Meth as 10/05/19. Client was asked to provid a UA sample. No signs or symptoms of intoxication or withdrawal noted or reported.    Dimension #2 - Biomedical - Risk 0. The client reports he has no medical needs unmet and is able to obtain any necessary care on his own.   Specific goals from treatment plan addressed this week:  Patient to maintain stable health throughout outpatient treatment.  Effectiveness of strategies:  Progressing: No Changes Noted.     Dimension #3 - Emotional/Behavioral/Cognitive - Risk 0. No emotional or mental health problems noted or reported.     Specific goals from treatment plan addressed this week:  N/A  Effectiveness of strategies:  N/A     Dimension #4 - Treatment Acceptance/Resistance - Risk 2. The Client appears to lack insight into his substance use problems due in part to cultural barriers that interfere with his understanding of the need for treatment.   Specific goals from treatment plan addressed this week:  Client will  "increase motivation towards recovery by participating in outpatient programming.   Effectiveness of strategies:  Progressing: The client attended both groups this week and participated in topic discussion without prompting. Client stated during check-in that he is sober today because he doesn't want to break the law any more and that he was grateful to have community resources that are helpful.     Dimension #5 - Relapse Potential - Risk 3. The client did not demonstrate any awareness of situations or emotions that trigger his drinking or display knowledge or understanding of the skills or coping mechanisms necessary to avoid those triggers.   Specific goals from treatment plan addressed this week:  Client will gain understanding and insight into situations and emotions that trigger his substance use.  Effectiveness of strategies:  Progressing: Client stated during check-in that he did not experience any thoughts or cravings for use this week.     Dimension #6 - Recovery Environment - Risk 3. Client is not currently attending any community based recovery support groups and did not identify any reliable means of recovery support.  Specific goals from treatment plan addressed this week:  Client will explore and identify healthy sober supports in his community.  Effectiveness of strategies:  Progressing: Client stated during check-in that he attended the [facility] Community Support group for the first time last Saturday.    T) Treatment plan updated: No. Patient notified and in agreement: NA.  Patient educated on \"Recognizing Stress  and  Stress Management . Client has completed 29 of 110 program hours at this time. Projected discharge date is 03/11/2020. Current discharge plan is not yet developed.     ALIA Zamarripa  11/1/2019, 1:27 PM  "

## 2021-06-02 NOTE — PROGRESS NOTES
Virginia Tatum attended 3 hours of group today.     The group topic was Stress Management, patient was responsive to topic.     Patient's engagement in the group session: medium     Total group size: 8      ALIA Zamarripa  10/29/2019, 3:48 PM

## 2021-06-02 NOTE — PROGRESS NOTES
Weekly Progress Note  Rc [place]  1990  [id number]     D) Pt attended 2 groups this week with 0 absences. Patient attended 0 individual sessions this week. A) Staff facilitated groups and reviewed tx progress. Assessed for [facility]. R) No VAP needed at this time.   Any significant events, defines as events that impact patient s relationship with others inside and outside of treatment: No  Indicate any changes or monitoring of physical or mental health problems No     Indicate involvement by any outside supports No  IAPP reviewed and modified as needed. NA  Pt working on the following dimensions:     Dimension #1 - Withdrawal Potential - Risk 0. Client stated his last date of use as 10/05/19. No signs or symptoms of intoxication or withdrawal noted or reported.   Specific goals from treatment plan addressed this week:  Patient to maintain abstinence throughout outpatient treatment.   Effectiveness of strategies:  Progressing: Client reported his last date of use of Meth as 10/05/19. Client provided a UA sample on 10/08/19 that tested positive for amphetamines. When informed of the positve result, the client admitted he had used Meth the previous weekend. No signs or symptoms of intoxication or withdrawal noted or reported.    Dimension #2 - Biomedical - Risk 0. The client reports he has no medical needs unmet and is able to obtain any necessary care on his own.   Specific goals from treatment plan addressed this week:  Patient to maintain stable health throughout outpatient treatment.  Effectiveness of strategies:  Progressing: Patient reports no issues in this area this week.     Dimension #3 - Emotional/Behavioral/Cognitive - Risk 0. No emotional or mental health problems noted or reported.     Specific goals from treatment plan addressed this week:  N/A  Effectiveness of strategies:  N/A     Dimension #4 - Treatment Acceptance/Resistance - Risk 2. The Client appears to lack insight into his substance use problems due  "in part to cultural barriers that interfere with his understanding of the need for treatment.   Specific goals from treatment plan addressed this week:  Client will increase motivation towards recovery by participating in outpatient programming.   Effectiveness of strategies:  Progressing: After missing both groups last week, the client attended both groups this week and participated in group discussion without prompting. Client stated during check in that he missed group because he had started a new job at night and was sleeping through group. Client stated he is sober today because he wants to stay out of trouble and that he was grateful to be sober.     Dimension #5 - Relapse Potential - Risk 3. The client did not demonstrate any awareness of situations or emotions that trigger his drinking or display knowledge or understanding of the skills or coping mechanisms necessary to avoid those triggers.   Specific goals from treatment plan addressed this week:  Client will gain understanding and insight into situations and emotions that trigger his substance use.  Effectiveness of strategies:  Progressing: Client reported his most recent date of use during check-in and participated in brief group discussion of relapse triggers.     Dimension #6 - Recovery Environment - Risk 3. Client is not currently attending any community based recovery support groups and did not identify any reliable means of recovery support.  Specific goals from treatment plan addressed this week:  Client will explore and identify healthy sober supports in his community.  Effectiveness of strategies:  Progressing: Client stated during check-in that he attended the [facility] group for the first time last Saturday and thought it was very helpful to listen to the experiences of other [facility].    T) Treatment plan updated: No. Patient notified and in agreement: NA.  Patient educated on \"Goals and Recovery . Client has " completed 14 of 110 program hours at this time. Projected discharge date is 03/11/2020. Current discharge plan is not yet developed.     ALIA Zamarripa  10/11/2019, 1:23 PM

## 2021-06-02 NOTE — PROGRESS NOTES
[place]ia Tatum attended 3 hours of group today.     The group topic was Recognizing Stress , patient was responsive to topic.     Patient's engagement in the group session: medium     Total group size: 8      ALIA Zamarripa  10/31/2019, 3:36 PM

## 2021-06-02 NOTE — PROGRESS NOTES
[place]ia Tatum attended 3 hours of group today.     The group topic was What Is Stopping You?, patient was responsive to topic.     Patient's engagement in the group session: medium     Total group size: 6      ALIA Zamarripa  10/15/2019, 3:28 PM

## 2021-06-02 NOTE — PROGRESS NOTES
Weekly Progress Note  Rc, [place]  1990  [id number]     D) Pt attended 1 groups this week with 1 absence. Patient attended 0 individual sessions this week. A) Staff facilitated groups and reviewed tx progress. Assessed for [facility]. R) No VAP needed at this time.   Any significant events, defines as events that impact patient s relationship with others inside and outside of treatment: No  Indicate any changes or monitoring of physical or mental health problems No     Indicate involvement by any outside supports No  IAPP reviewed and modified as needed. NA  Pt working on the following dimensions:     Dimension #1 - Withdrawal Potential - Risk 0. Client stated his last date of use as 10/05/19. No signs or symptoms of intoxication or withdrawal noted or reported.   Specific goals from treatment plan addressed this week:  Patient to maintain abstinence throughout outpatient treatment.   Effectiveness of strategies:  Progressing: Client reported his last date of use of Meth as 10/05/19. Client provided a UA sample on 10/08/19 that tested positive for amphetamines. When informed of the positve result, the client admitted he had used Meth the previous weekend. No signs or symptoms of intoxication or withdrawal noted or reported.    Dimension #2 - Biomedical - Risk 0. The client reports he has no medical needs unmet and is able to obtain any necessary care on his own.   Specific goals from treatment plan addressed this week:  Patient to maintain stable health throughout outpatient treatment.  Effectiveness of strategies:  Progressing: Patient reports no issues in this area this week.     Dimension #3 - Emotional/Behavioral/Cognitive - Risk 0. No emotional or mental health problems noted or reported.     Specific goals from treatment plan addressed this week:  N/A  Effectiveness of strategies:  N/A     Dimension #4 - Treatment Acceptance/Resistance - Risk 2. The Client appears to lack insight into his substance use problems due  "in part to cultural barriers that interfere with his understanding of the need for treatment.   Specific goals from treatment plan addressed this week:  Client will increase motivation towards recovery by participating in outpatient programming.   Effectiveness of strategies:  Progressing: The client attended both groups this week and participated in topic discussion without prompting. Client stated during check-in that he is sober today because he wants to stay out of trouble and that he was grateful that he isn't having cravings any more.     Dimension #5 - Relapse Potential - Risk 3. The client did not demonstrate any awareness of situations or emotions that trigger his drinking or display knowledge or understanding of the skills or coping mechanisms necessary to avoid those triggers.   Specific goals from treatment plan addressed this week:  Client will gain understanding and insight into situations and emotions that trigger his substance use.  Effectiveness of strategies:  Progressing: Client stated during check-in that he did not experience any thoughts or cravings for use this week.     Dimension #6 - Recovery Environment - Risk 3. Client is not currently attending any community based recovery support groups and did not identify any reliable means of recovery support.  Specific goals from treatment plan addressed this week:  Client will explore and identify healthy sober supports in his community.  Effectiveness of strategies:  Progressing: Client stated during check-in that he did not attend the [facility] Community Support group last Saturday because he forgot about it. Client has encouraged to set a reminder for himself.    T) Treatment plan updated: No. Patient notified and in agreement: NA.  Patient educated on \"Feelings in Addiction . Client has completed 23 of 110 program hours at this time. Projected discharge date is 03/11/2020. Current discharge plan is not yet developed.     Noah Fierro, " [facility]  10/25/2019, 11:57 AM

## 2021-06-02 NOTE — PROGRESS NOTES
[place]ia Tatum attended 3 hours of group today.     The group topic was Goals and Recovery, patient was responsive to topic.     Patients engagement in the group session: medium     Total group size: 6      ALIA Zamarripa  10/8/2019, 3:33 PM

## 2021-06-02 NOTE — PROGRESS NOTES
Weekly Progress Note  Rc [place]  1990  [id number]     D) Pt attended 2 groups this week with 0 absences. Patient attended 0 individual sessions this week. A) Staff facilitated groups and reviewed tx progress. Assessed for [facility]. R) No VAP needed at this time.   Any significant events, defines as events that impact patient s relationship with others inside and outside of treatment: No  Indicate any changes or monitoring of physical or mental health problems No     Indicate involvement by any outside supports No  IAPP reviewed and modified as needed. NA  Pt working on the following dimensions:     Dimension #1 - Withdrawal Potential - Risk 0. Client stated his last date of use as 10/05/19. No signs or symptoms of intoxication or withdrawal noted or reported.   Specific goals from treatment plan addressed this week:  Patient to maintain abstinence throughout outpatient treatment.   Effectiveness of strategies:  Progressing: Client reported his last date of use of Meth as 10/05/19. Client provided a UA sample on 10/08/19 that tested positive for amphetamines. When informed of the positve result, the client admitted he had used Meth the previous weekend. No signs or symptoms of intoxication or withdrawal noted or reported.    Dimension #2 - Biomedical - Risk 0. The client reports he has no medical needs unmet and is able to obtain any necessary care on his own.   Specific goals from treatment plan addressed this week:  Patient to maintain stable health throughout outpatient treatment.  Effectiveness of strategies:  Progressing: Patient reports no issues in this area this week.     Dimension #3 - Emotional/Behavioral/Cognitive - Risk 0. No emotional or mental health problems noted or reported.     Specific goals from treatment plan addressed this week:  N/A  Effectiveness of strategies:  N/A     Dimension #4 - Treatment Acceptance/Resistance - Risk 2. The Client appears to lack insight into his substance use problems due  "in part to cultural barriers that interfere with his understanding of the need for treatment.   Specific goals from treatment plan addressed this week:  Client will increase motivation towards recovery by participating in outpatient programming.   Effectiveness of strategies:  Progressing: The client attended both groups this week and participated in topic discussion without prompting. Client stated during check-in that he is sober today because he feels like it is time for him to stop and that he was grateful to be able to help a friend move.     Dimension #5 - Relapse Potential - Risk 3. The client did not demonstrate any awareness of situations or emotions that trigger his drinking or display knowledge or understanding of the skills or coping mechanisms necessary to avoid those triggers.   Specific goals from treatment plan addressed this week:  Client will gain understanding and insight into situations and emotions that trigger his substance use.  Effectiveness of strategies:  Progressing: Client reported his most recent date of use during check-in and participated in brief group discussion of relapse triggers.     Dimension #6 - Recovery Environment - Risk 3. Client is not currently attending any community based recovery support groups and did not identify any reliable means of recovery support.  Specific goals from treatment plan addressed this week:  Client will explore and identify healthy sober supports in his community.  Effectiveness of strategies:  Progressing: Client stated during check-in that he did not attend the [facility] Community Support group last Saturday because he was sleeping.    T) Treatment plan updated: No. Patient notified and in agreement: NA.  Patient educated on \"Recovery Thinking . Client has completed 20 of 110 program hours at this time. Projected discharge date is 03/11/2020. Current discharge plan is not yet developed.     Noah Fierro, [facility]  10/17/2019, 3:26 PM  "

## 2021-06-02 NOTE — PROGRESS NOTES
[place]ia Tatum attended 3 hours of group today.     The group topic was Recovery Thinking, patient was responsive to topic.     Patient's engagement in the group session: medium     Total group size: 7      ALIA Zamarripa  10/17/2019, 3:18 PM

## 2021-06-02 NOTE — PROGRESS NOTES
[place]ia Tatum attended 3 hours of group today.     The group topic was Feelings in Addiction, patient was responsive to topic.     Patient's engagement in the group session: medium     Total group size: 8      ALIA Zamarripa  10/22/2019, 3:26 PM

## 2021-06-03 VITALS — BODY MASS INDEX: 21.69 KG/M2 | WEIGHT: 135 LBS | HEIGHT: 66 IN

## 2021-06-03 NOTE — PROGRESS NOTES
Weekly Progress Note  Rc, [place]  1990  [id number]     D) Pt attended 1 groups this week with 1 excused absence. Patient attended 0 individual sessions this week. A) Staff facilitated groups and reviewed tx progress. Assessed for [facility]. R) No VAP needed at this time.   Any significant events, defines as events that impact patient s relationship with others inside and outside of treatment: No  Indicate any changes or monitoring of physical or mental health problems No     Indicate involvement by any outside supports No  IAPP reviewed and modified as needed. NA  Pt working on the following dimensions:     Dimension #1 - Withdrawal Potential - Risk 0. Client stated his last date of use as 11/06/19. No signs or symptoms of intoxication or withdrawal noted or reported.   Specific goals from treatment plan addressed this week:  Patient to maintain abstinence throughout outpatient treatment.   Effectiveness of strategies:  Progressing: Client reported his last date of use of Meth as 11/06/19. Client's urinalysis results from 11/14/19 were positive and the client was asked to provide another sample on 11/19/19.    Dimension #2 - Biomedical - Risk 0. The client reports he has no medical needs unmet and is able to obtain any necessary care on his own.   Specific goals from treatment plan addressed this week:  Patient to maintain stable health throughout outpatient treatment.  Effectiveness of strategies:  Progressing: No Changes Noted.     Dimension #3 - Emotional/Behavioral/Cognitive - Risk 0. No emotional or mental health problems noted or reported.     Specific goals from treatment plan addressed this week:  N/A  Effectiveness of strategies:  N/A     Dimension #4 - Treatment Acceptance/Resistance - Risk 2. The Client appears to lack insight into his substance use problems due in part to cultural barriers that interfere with his understanding of the need for treatment.   Specific goals from treatment plan addressed this  "week:  Client will increase motivation towards recovery by participating in outpatient programming.   Effectiveness of strategies:  Progressing: The client attended one group this week and participated in topic discussion without prompting. Client stated during check-in that he is sober today because he doesn't want to waste so much time and money and that he was grateful to be feeling happier without using.     Dimension #5 - Relapse Potential - Risk 3. The client did not demonstrate any awareness of situations or emotions that trigger his drinking or display knowledge or understanding of the skills or coping mechanisms necessary to avoid those triggers.   Specific goals from treatment plan addressed this week:  Client will gain understanding and insight into situations and emotions that trigger his substance use.  Effectiveness of strategies:  Progressing: Client stated during check-in that he did not experience any thoughts or cravings for use this week. Client shared his most recent date of use during check-in and participated in group discussion of consequences of continued use. .     Dimension #6 - Recovery Environment - Risk 3. Client is not currently attending any community based recovery support groups and did not identify any reliable means of recovery support.  Specific goals from treatment plan addressed this week:  Client will explore and identify healthy sober supports in his community.  Effectiveness of strategies:  Progressing: Client stated during check-in that he attended the [facility] Community Support group last Saturday and that he thinks he will be able to learn more in that group. Client was absent from group 11/21/19 due to having a court appearance.    T) Treatment plan updated: No. Patient notified and in agreement: NA.  Patient educated on \"What is Addiction? . Client has completed 44 of 110 program hours at this time. Projected discharge date is 03/11/2020. Current discharge plan is " not yet developed.     Noah Fierro, [facility]  11/22/2019, 12:39 PM

## 2021-06-03 NOTE — PROGRESS NOTES
[place]ia Tatum attended 3 hours of group today.     The group topic was Relapse Autopsy, patient was responsive to topic.     Patient's engagement in the group session: medium     Total group size: 7      ALIA Zamarripa  11/12/2019, 4:30 PM

## 2021-06-03 NOTE — PROGRESS NOTES
Weekly Progress Note  Rc[place]  1990  [id number]     D) Pt attended 1 groups this week with 0 absences. Patient attended 0 individual sessions this week. A) Staff facilitated groups and reviewed tx progress. Assessed for [facility]. R) No VAP needed at this time.   Any significant events, defines as events that impact patient s relationship with others inside and outside of treatment: No  Indicate any changes or monitoring of physical or mental health problems No     Indicate involvement by any outside supports No  IAPP reviewed and modified as needed. NA  Pt working on the following dimensions:     Dimension #1 - Withdrawal Potential - Risk 0. Client stated his last date of use of Meth as 11/06/19, and his last date of use of ETOH as 11/24/19. No signs or symptoms of intoxication or withdrawal noted or reported.   Specific goals from treatment plan addressed this week:  Patient to maintain abstinence throughout outpatient treatment.   Effectiveness of strategies:  Progressing: Client reported his last date of use  as 11/24/19. No signs or symptoms of intoxication or withdrawal noted or reported.     Dimension #2 - Biomedical - Risk 0. The client reports he has no medical needs unmet and is able to obtain any necessary care on his own.   Specific goals from treatment plan addressed this week:  Patient to maintain stable health throughout outpatient treatment.  Effectiveness of strategies:  Progressing: No Changes Noted.     Dimension #3 - Emotional/Behavioral/Cognitive - Risk 0. No emotional or mental health problems noted or reported.     Specific goals from treatment plan addressed this week:  N/A  Effectiveness of strategies:  N/A     Dimension #4 - Treatment Acceptance/Resistance - Risk 2. The Client appears to lack insight into his substance use problems due in part to cultural barriers that interfere with his understanding of the need for treatment.   Specific goals from treatment plan addressed this week:  Client  "will increase motivation towards recovery by participating in outpatient programming.   Effectiveness of strategies:  Progressing: The client attended one group this week and participated in topic discussion without prompting. Client stated during check-in that he is sober today because he wants to avoid trouble and that he was grateful to be able to stay off of Meth.     Dimension #5 - Relapse Potential - Risk 3. The client did not demonstrate any awareness of situations or emotions that trigger his drinking or display knowledge or understanding of the skills or coping mechanisms necessary to avoid those triggers.   Specific goals from treatment plan addressed this week:  Client will gain understanding and insight into situations and emotions that trigger his substance use.  Effectiveness of strategies:  Progressing: Client shared his most recent date of use during check-in and participated in group discussion of consequences of continued use.     Dimension #6 - Recovery Environment - Risk 3. Client is not currently attending any community based recovery support groups and did not identify any reliable means of recovery support.  Specific goals from treatment plan addressed this week:  Client will explore and identify healthy sober supports in his community.  Effectiveness of strategies:  Progressing: Client stated during check-in that he attended the [facility] Community Support group last Saturday.    T) Treatment plan updated: No. Patient notified and in agreement: NA.  Patient educated on \"Doctor's Presentation . Client has completed 47 of 110 program hours at this time. Projected discharge date is 03/11/2020. Current discharge plan is not yet developed.     ALIA Zamarripa  11/27/2019, 12:50 PM  "

## 2021-06-03 NOTE — PROGRESS NOTES
[place]ia Tatum attended 3 hours of group today.     The group topic was Healthy Family Communication, patient was responsive to topic.     Patient's engagement in the group session: medium     Total group size: 9      ALIA Zamarripa  11/14/2019, 3:36 PM

## 2021-06-03 NOTE — PROGRESS NOTES
[place]ia Tatum attended 2 hours of group today.     The group topic was Doctor's Presentation, patient was responsive to topic.     Patient's engagement in the group session: medium     Total group size: 6      ALIA Zamarripa  11/26/2019, 3:05 PM

## 2021-06-03 NOTE — PROGRESS NOTES
Virginia Tatum attended 3 hours of group today.     The group topic was Jails, Hospitals and Death, patient was responsive to topic.     Patient's engagement in the group session: Medium    Total group size: 7      ALIA Zamarripa  12/3/2019, 3:55 PM

## 2021-06-03 NOTE — PROGRESS NOTES
[place]ia Tatum attended 3 hours of group today.     The group topic was What is Addiction?, patient was responsive to topic.     Patient's engagement in the group session: medium     Total group size: 7      ALIA Zamarripa  11/19/2019, 3:38 PM

## 2021-06-03 NOTE — PROGRESS NOTES
Weekly Progress Note  [place]  1990  [id number]     D) Pt attended 2 groups this week with 0 absences. Patient attended 0 individual sessions this week. A) Staff facilitated groups and reviewed tx progress. [facility] for [facility]. R) No VAP needed at this time.   Any significant events, defines as events that impact patient s relationship with others inside and outside of treatment: No  Indicate any changes or monitoring of physical or mental health problems No     Indicate involvement by any outside supports No  IAPP reviewed and modified as needed. NA  Pt working on the following dimensions:     Dimension #1 - Withdrawal Potential - Risk 0. Client stated his last date of use as 11/06/19. No signs or symptoms of intoxication or withdrawal noted or reported.   Specific goals from treatment plan addressed this week:  Patient to maintain abstinence throughout outpatient treatment.   Effectiveness of strategies:  Progressing: Client reported his last date of use of Meth as 11/06/19. Client was reminded that abstinence is an expectation of this treatment program and asked to provid a UA sample. No signs or symptoms of intoxication or withdrawal noted or reported.    Dimension #2 - Biomedical - Risk 0. The client reports he has no medical needs unmet and is able to obtain any necessary care on his own.   Specific goals from treatment plan addressed this week:  Patient to maintain stable health throughout outpatient treatment.  Effectiveness of strategies:  Progressing: No Changes Noted.     Dimension #3 - Emotional/Behavioral/Cognitive - Risk 0. No emotional or mental health problems noted or reported.     Specific goals from treatment plan addressed this week:  N/A  Effectiveness of strategies:  N/A     Dimension #4 - Treatment Acceptance/Resistance - Risk 2. The Client appears to lack insight into his substance use problems due in part to cultural barriers that interfere with his understanding of the need for treatment.  "  Specific goals from treatment plan addressed this week:  Client will increase motivation towards recovery by participating in outpatient programming.   Effectiveness of strategies:  Progressing: The client attended both groups this week and participated in topic discussion without prompting. Client stated during check-in that he is sober today because he is starting to feel better without using and that he was grateful to be sober.     Dimension #5 - Relapse Potential - Risk 3. The client did not demonstrate any awareness of situations or emotions that trigger his drinking or display knowledge or understanding of the skills or coping mechanisms necessary to avoid those triggers.   Specific goals from treatment plan addressed this week:  Client will gain understanding and insight into situations and emotions that trigger his substance use.  Effectiveness of strategies:  Progressing: Client stated during check-in that he did not experience any thoughts or cravings for use this week. Client shared his most recent date of use during check-in and participated in group discussion of his triggers for use. Client appeared to accept feedback from his counselor and peers.     Dimension #6 - Recovery Environment - Risk 3. Client is not currently attending any community based recovery support groups and did not identify any reliable means of recovery support.  Specific goals from treatment plan addressed this week:  Client will explore and identify healthy sober supports in his community.  Effectiveness of strategies:  Progressing: Client stated during check-in that he did not attend the [facility] Community Support group last Saturday because he was feeling sick. Client also reported he has court in [facility] on 11/21/19 and will not be in group that day.    T) Treatment plan updated: No. Patient notified and in agreement: NA.  Patient educated on \"Relapse Autopsy  and  Healthy Family Communication . Client has " completed 35 of 110 program hours at this time. Projected discharge date is 03/11/2020. Current discharge plan is not yet developed.     ALIA Zamarripa  11/15/2019, 2:33 PM

## 2021-06-04 NOTE — PROGRESS NOTES
Virginia Tatum attended 3 hours of group today.     The group topic was What Is Stopping You?, patient was responsive to topic.     Patient's engagement in the group session: medium     Total group size: 6      ALIA Zamarripa  12/31/2019, 3:34 PM

## 2021-06-04 NOTE — PROGRESS NOTES
Virginia Tatum attended 2 hours of group today.     The group topic was The Power of Positive Thinking, patient was responsive to topic.     Patient's engagement in the group session: low     Client was asked to give a urinalysis sample after group but left group early without giving an explanation and did not submit a sample.    Total group size: 6      No[facility] Fierro [facility]  12/19/2019, 3:50 PM
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
## 2021-06-04 NOTE — PROGRESS NOTES
Weekly Progress Note  Virginia Tatum  1990  [id number]     D) Pt attended 1 groups this week with 1 absence. Patient attended 0 individual sessions this week. A) Staff facilitated groups and reviewed tx progress. Assessed for [facility]. R) No VAP needed at this time.   Any significant events, defines as events that impact patient s relationship with others inside and outside of treatment: No  Indicate any changes or monitoring of physical or mental health problems No     Indicate involvement by any outside supports No  IAPP reviewed and modified as needed. NA  Pt working on the following dimensions:     Dimension #1 - Withdrawal Potential - Risk 0. Client stated his last date of use of Meth as 11/06/19, and his last date of use of ETOH as 11/24/19. No signs or symptoms of intoxication or withdrawal noted or reported.   Specific goals from treatment plan addressed this week:  Patient to maintain abstinence throughout outpatient treatment.   Effectiveness of strategies:  Progressing: Client stated no use this week. No signs or symptoms of intoxication or withdrawal noted or reported.     Dimension #2 - Biomedical - Risk 0. The client reports he has no medical needs unmet and is able to obtain any necessary care on his own.   Specific goals from treatment plan addressed this week:  Patient to maintain stable health throughout outpatient treatment.  Effectiveness of strategies:  Progressing: No Changes Noted.     Dimension #3 - Emotional/Behavioral/Cognitive - Risk 0. No emotional or mental health problems noted or reported.     Specific goals from treatment plan addressed this week:  N/A  Effectiveness of strategies:  N/A     Dimension #4 - Treatment Acceptance/Resistance - Risk 2. The Client appears to lack insight into his substance use problems due in part to cultural barriers that interfere with his understanding of the need for treatment.   Specific goals from treatment plan addressed this week:  Client will increase  "motivation towards recovery by participating in outpatient programming.   Effectiveness of strategies:  Progressing: The client attended one group this week and participated in topic discussion without prompting. Client stated during check-in that he is sober today because using isn't worth the trouble it causes and that he was grateful for the snow.     Dimension #5 - Relapse Potential - Risk 3. The client did not demonstrate any awareness of situations or emotions that trigger his drinking or display knowledge or understanding of the skills or coping mechanisms necessary to avoid those triggers.   Specific goals from treatment plan addressed this week:  Client will gain understanding and insight into situations and emotions that trigger his substance use.  Effectiveness of strategies:  Progressing: Client stated during check-in that he had experienced no thoughts or cravings to drink this week.     Dimension #6 - Recovery Environment - Risk 3. Client is not currently attending any community based recovery support groups and did not identify any reliable means of recovery support.  Specific goals from treatment plan addressed this week:  Client will explore and identify healthy sober supports in his community.  Effectiveness of strategies:  Progressing: Client stated during check-in that he did not attend the [facility] Community Support group last Saturday. Client reported he had court this week and has jury trial scheduled in January for 2 charges in [facility]. Client agreed to bring contact information for his  and sign a release of information.    T) Treatment plan updated: No. Patient notified and in agreement: NA.  Patient educated on \"What is Recovery? . Client has completed 47 of 110 program hours at this time. Projected discharge date is 03/11/2020. Current discharge plan is not yet developed.     ALIA Zamarripa  12/13/2019, 12:39 PM  "
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
## 2021-06-04 NOTE — PROGRESS NOTES
Virginia Tatum attended 3 hours of group today.     The group topic was What is Recovery?, patient was responsive to topic.     Patient's engagement in the group session: medium     Total group size: 6      ALIA Zamarripa  12/12/2019, 3:35 PM

## 2021-06-05 NOTE — PROGRESS NOTES
OUTPATIENT SUBSTANCE USE DISORDER TREATMENT  DISCHARGE SUMMARY    Name:  Virginia Tatum :  ALIA Zamarripa   Admit Date: 2019 Discharge Date: 2020   :  1990 Hours Completed: 52   Initial Diagnosis:  Stimulant Use Disorder, moderate amphetamine type substance (F15.20) Final Diagnosis:  Stimulant Use Disorder, moderate amphetamine type substance (F15.20)   Discharge Address:    [address] [place] [address] Funding Source:    [employer]     Discharge Type:  Absent Without Leave (AWOL)     Client was receiving residential services at the time of discharge:   No      Reasons for and circumstances of service termination:  Virginia Tatum  is a 29 y.o. year-old male who admitted to [facility] on 19 and has completed 52 hours as of 2020. Client is being discharged AWOL after missing 3 consecutive scheduled groups and failing to make contact with his counselor to explain his absence. Client's peers have reported on at least 5 occasions that the client was present in the building prior to group start times but did not attend.  Client received Basic Education on group rules and norms and treatment orientation, and information on the following topics; Feelings in Addiction, Stress Management, What is Addiction?, Jails, Hospitals and Death, Reviewing Our Life Experiences, What is Recovery?, What Is Stopping You?, Thinking Comes Before Drinking, Doctor's Presentation, Recognizing Stress and Handling Stress, Goals and Recovery, Recovery Thinking, Relapse Autopsy, and Healthy Family Communication.      If program discharge status was At Staff Request, the license lieberman must identify the following:     Other interested parties conferred with: N/A  Referrals provided: N/A  Alternatives considered and attempted before deciding to discharge: N/A     Dimension/Course of Treatment/Individualized Care:   1. Withdrawal Potential - Intake Risk level - 0  Discharge Risk level - UNABLE TO ASSESS  Narrative supporting risk description:  No signs of intoxication or withdrawal noted or reported.   Treatment plan goals and progress towards those goals:  Goal: The Client s reported last date of use is 11/24/19, but provided a urine sample that tested positive for Methamphetamine on 12/31/19.   2. Biomedical Conditions and Complications - Intake Risk level - 0 Discharge Risk level - UNABLE TO ASSESS  Narrative supporting risk description:  The client reported he had no medical needs unmet and was able to obtain any necessary care on his own.   Treatment plan goals and progress towards those goals:  Goal: Patient to maintain stable health throughout outpatient treatment.   No Changes noted during treatment.     3. Emotional/Behavioral/Cognitive Conditions and Complications - Intake Risk level - 0 Discharge Risk level - UNABLE TO ASSESS  Narrative supporting risk description:  Client is of [place] ethnicity, nationality from [facility]. He does not speak any English.   Treatment plan goals and progress towards those goals:  Goal: Maintain Stability of Mental Health.  Client participated in groups inconsistently when attending, sometimes minimally. The Client did not attend any groups or contact his counselor for 3 consecutive weeks.    4. Readiness for Change - Intake Risk level - 2 Discharge Risk level - UNABLE TO ASSESS  Narrative supporting risk description:  On admission, the client appeared to lack insight into his drinking problems due in part to language and cultural barriers that interfered with his understanding of the need for treatment. Client stated his motivation for attending treatment was to comply with probation.  Treatment plan goals and progress towards those goals:  Goal: Patient to increase motivation towards recovery by participating in outpatient programming.   Client participated inconsistently in group, with and without active prompting. Client offered  limited feedback to program peers and reported little outside support group attendance, in spite of free rides to and from the support group being provided. The Client did not attend any groups or contact his counselor for 3 consecutive weeks and provided a urine sample that tested positive for Methamphetamine on 12/31/19.   5. Relapse/Continued Use/Continued Problem Potential - Intake Risk level - 3 Discharge Risk level - UNABLE TO ASSESS  Narrative supporting risk description:  Client had identified no significant periods of abstinence from mood-altering substances at time of admission. Client had no previous outpatient treatment episode at time of program admission.   On admission the client did not demonstrate any awareness of situations or emotions that triggered his substance use or display any knowledge or understanding of the skills or coping mechanisms necessary to avoid those triggers. The Client lacked awareness and understanding of western concepts regarding substance use disorders due to language and cultural barriers.  Treatment plan goals and progress towards those goals:  Goal: Client will gain insight into situations and emotions that trigger his drinking.  Client was inconsistently active program participant during treatment but did participate in group discussion of relapse triggers and coping skills when confronted about his continued use.   6.  Recovery Environment - Intake Risk level -  3 Discharge Risk level - UNABLE TO ASSESS  Narrative supporting risk description:  On admission, the client reported he was not employed or involved in any training program and was living with his family. Client reported his family was supportive of his treatment. The client was not involved in any volunteer activities and was not participating in any community based recovery support groups. Client had a probation violation hearing pending related to current probation for drug possession in [facility] and  had charges pending in [facility] for Prostitution and Felony Drug Possession.  Treatment plan goals and progress towards those goals:  Goal: Client will explore and identify healthy sober supports in his community.  Client reported only one instance of attendance at community support groups in spite of repeated reminders and transportation resources.    Strengths: The only strength the client would identify was being a good .  Needs: ESL Classes; Ongoing Recovery Support  Services Provided: Intake; assessment; treatment planning; Group Therapy; Psychoeducation; Service Coordination; lectures; 1x1 therapy; and recommendations at discharge.      Program Involvement:                Fair  Attendance:                                Poor  Ability to relate in group/               Other program activities:            Poor  Assignment Completion:             Poor  Overall Behavior:                        Fair  Reported Family/Significant          Other Involvement:                     N/A     Prognosis:                                   Poor     Recommendations                                                                                                        Client is recommended to abstain from the use of any mood altering substances, remain law abiding, obtain and follow the recommendations of a new chemical health assessment, Attend [facility] Community Support Group, Identify and Maintain a Social Network of Sober Friends and to follow requirements of probation.    Mental Health Referral  Mental Health Evaluation    Physical Health Referral:    Primary Care Physician: Leona Samuel MD     Counselor Name and Title:  ALIA Zamarripa      Date:  1/14/2020  Time:  12:01 PM

## 2021-06-15 NOTE — ANESTHESIA POSTPROCEDURE EVALUATION
Patient: Virginia Tatum  AWAKE INTUBATION INCISION AND DRAINAGE OF PERITONSILAR ABCESS  Anesthesia type: general    Patient location: PACU  Last vitals:   Vitals:    01/04/18 0220   BP: (!) 87/52   Pulse: 94   Resp: 14   Temp:    SpO2: 100%     Post vital signs: stable  Level of consciousness: sedated  Post-anesthesia pain: pain controlled  Post-anesthesia nausea and vomiting: no  Pulmonary: ETT  Cardiovascular: stable  Hydration: adequate  Anesthetic events: no    QCDR Measures:  ASA# 11 - Erna-op Cardiac Arrest: ASA11B - Patient did NOT experience unanticipated cardiac arrest  ASA# 12 - Erna-op Mortality Rate: ASA12B - Patient did NOT die  ASA# 13 - PACU Re-Intubation Rate: ASA13X - Exclusion: organ donor or direct ICU transfer  ASA# 10 - Composite Anes Safety: ASA10A - No serious adverse event    Additional Notes: Patient with controlled urgent intubation d/t tracheal deviation and upper airway narrowing from pharyngeal abscess. Patient to remain intubation overnight.

## 2021-06-15 NOTE — ANESTHESIA PROCEDURE NOTES
Emergent Intubation  Date/Time: 1/4/2018 1:31 AM  Anesthesiologist: JAKE CHOI  Indications: respiratory distress (peritonsillar abscess, narrowing of upper airway to <1cm)  Route: oral  Technique: fiberoptic assisted  Tube size: 7.0 mm  Tube type: cuffed  Cuff inflated: yes  ETT to lip: 24 cm  Tube secured with: adhesive tape and ETT lieberman  ETCO2 = Yes  Breath sounds: equal  Comments: Controlled intubation in OR. Glyco 0.4mg and lidocaine neb given prior to awake fiberoptic. 8mL 4% lidocaine topically given to numb the upper airway. Successfully intubated.

## 2021-06-15 NOTE — ANESTHESIA PREPROCEDURE EVALUATION
Anesthesia Evaluation      Patient summary reviewed   No history of anesthetic complications     Airway   Mallampati: IV  Neck ROM: limited   Pulmonary - normal exam     ROS comment: Peritonsillar abscess                         Cardiovascular - negative ROS and normal exam   Neuro/Psych - negative ROS     Endo/Other - negative ROS      GI/Hepatic/Renal - negative ROS      Other findings: CT neck:       CONCLUSION:   1. There is a large 2.9 x 2.8 x 3.7 cm left palatine tonsillar/peritonsillar abscess. This results in considerable narrowing of the airway at this level. Inflammatory changes and thickening extend along the left pharyngeal mucosa with effacement of the  left vallecula, thickening of the left aspect of the epiglottis and aryepiglottic fold.    2. A 0.4 x 0.8 cm peripherally enhancing centrally hypodense structure anterior to the left internal jugular vein may represent a small venous branch with incomplete contrast filling. Less likely, this could represent a small necrotic lymph node.     3. A mildly enlarged left level 2A lymph node is presumably reactive.     Limited neck extension  Sedated  Mouthing opening ~3cm        Dental                         Anesthesia Plan  Planned anesthetic: general endotracheal  Awake intubation  ASA 4 - emergent   Induction: intravenous   Anesthetic plan and risks discussed with: patient  Anesthesia plan special considerations: increased risk of difficult airway,   Post-op plan: extended intubation/vent support

## 2021-06-15 NOTE — PROGRESS NOTES
"SUBJECTIVE  Virginia Tatum is a 27 y.o. male here for follow-up and to establish care:    Peritonsillar abscess: Admitted 1/3 and discharged 1/6/18. He developed respiratory failure secondary to oral secretions, requiring intubation and underwent I&D by ENT of peritonsilar abscess. He was treated with IV zosyn and then discharged on clindamycin (through 1/16/18). He was taking clindamycin and ibuprofen. However, he then developed bleeding and throat pain this morning and went to ED. Lab work was done which showed no significant anemia. He was given nebulized epinephrine and lidocaine with epi with cessation of bleeding. Repeat imaging showed decrease in size of previous peritonsillar abscess with some possible active bleeding. ENT was consulted and recommended dilute peroxide solution which stopped his bleeding. He was advised to stop clindamycin and ibpurofen and start omnicef and liquid roxicodone to help with pain. He is here today for follow-up. He reports he is able to swallow but having difficulty with solid foods. Tolerating liquids without difficulty. Denies any further bleeding or purulent discharge. No neck swelling or fever/chills.     Health maintenance: due for flu    Tobacco use: 1-2 cigarettes per day, has cut back significantly. Precontemplative about quitting at this time.     ROS  Complete 10 point review of systems negative except as noted above in HPI    Past Medical History  Peritonsillar abscess s/p I&D    Past Surgical History:  None    Medications:  Omnicef  Roxicodone prn    Family History:  Parents- healthy  Siblings- healthy    Social History:  Tobacco- 1-2 cigs per day  EtOH: occasional  Drug: denies    OBJECTIVE  /78 (Patient Site: Right Arm, Patient Position: Sitting, Cuff Size: Adult Regular)  Pulse 92  Temp 97.9  F (36.6  C) (Oral)   Resp 16  Ht 5' 7\" (1.702 m)  Wt 144 lb (65.3 kg)  BMI 22.55 kg/m2     General: Cooperative, pleasant, in no acute distress  HEENT: NCAT, L " peritonsilar region with overlying scab, hemostatic.   Neck: no lymphadenopathy, no masses  CV: RRR, normal S1/S2, no murmur, rubs, gallops  Resp: No respiratory distress. Clear to auscultation bilaterally. No wheezes, rales, rhonchi  Ext: moves all extremities equally  MSK: strength grossly intact  Neuro: no focal deficits  Skin: no rashes  Psych: normal mood and affect    LABS & IMAGES   Results for orders placed or performed during the hospital encounter of 01/11/18   Type and Screen   Result Value Ref Range    ABORh O POS     Antibody Screen Negative Negative   HM2(CBC w/o Differential)   Result Value Ref Range    WBC 12.1 (H) 4.0 - 11.0 thou/uL    RBC 5.28 4.40 - 6.20 mill/uL    Hemoglobin 15.1 14.0 - 18.0 g/dL    Hematocrit 44.6 40.0 - 54.0 %    MCV 85 80 - 100 fL    MCH 28.6 27.0 - 34.0 pg    MCHC 33.9 32.0 - 36.0 g/dL    RDW 12.9 11.0 - 14.5 %    Platelets 431 140 - 440 thou/uL    MPV 9.1 8.5 - 12.5 fL   Basic Metabolic Panel   Result Value Ref Range    Sodium 138 136 - 145 mmol/L    Potassium 3.7 3.5 - 5.0 mmol/L    Chloride 101 98 - 107 mmol/L    CO2 24 22 - 31 mmol/L    Anion Gap, Calculation 13 5 - 18 mmol/L    Glucose 95 70 - 125 mg/dL    Calcium 10.1 8.5 - 10.5 mg/dL    BUN 23 (H) 8 - 22 mg/dL    Creatinine 1.03 0.70 - 1.30 mg/dL    GFR MDRD Af Amer >60 >60 mL/min/1.73m2    GFR MDRD Non Af Amer >60 >60 mL/min/1.73m2   INR   Result Value Ref Range    INR 0.83 (L) 0.90 - 1.10       ASSESSMENT/PLAN:   Virginia was seen today for hospital visit follow up.    Diagnoses and all orders for this visit:    History of peritonsillar abscess: Hospitalized in early January s/p I&D by ENT. Was on clindamycin; however he went to ED this morning for bleeding and increased pain. ENT was consulted and hemostasis was achieved with dilute peroxide solution. Was advised to stop clindamycin and start omnicef for 7 days. Also advised to stop ibuprofen (risk of bleeding) and start liquid roxicodone for pain for the next few days.  Counseled on soft foods to avoid reopening scab in posterior tonsilar area. Afebrile and vitally stable. Hgb was normal. Discussed warning signs and when to return.     Need for immunization against influenza  -     Influenza, Seasonal,Quad Inj, 36+ MOS      Visit was completed along with Elva     Options for treatment and follow-up care were reviewed with the patient. Pah T Rc and/or guardian was engaged and actively involved in the decision making process. Pah T Rc and/or guardian verbalized understanding of the options discussed and was satisfied with the final plan.      Leona Samuel MD

## 2021-06-15 NOTE — ANESTHESIA CARE TRANSFER NOTE
Last vitals:   Vitals:    01/04/18 0200   BP: 91/55   Pulse: (!) 105   Resp: 14   Temp: 36.9  C (98.4  F)   SpO2: 100%   Pt transported to PACU, ambued, to vent per RT upon arrival.  VSS, propofol gtt infusing, report to RN.   Patient's level of consciousness is unresponsive, intubated, sedated, on propofol infusion.  Spontaneous respirations: no: intubated, vented  Maintains airway independently: no: intubated, vented  Dentition unchanged: yes  Oropharynx: endotracheal tube in place    QCDR Measures:  ASA# 20 - Surgical Safety Checklist: WHO surgical safety checklist completed prior to induction  PQRS# 430 - Adult PONV Prevention: 4558F-1P - Medical reason for NOT administering combination therapy  ASA# 8 - Peds PONV Prevention: NA - Not pediatric patient, not GA or 2 or more risk factors NOT present  PQRS# 424 - Erna-op Temp Management: 4559F - At least one body temp DOCUMENTED => 35.5C or 95.9F within required timeframe  PQRS# 426 - PACU Transfer Protocol: - Transfer of care checklist used  ASA# 14 - Acute Post-op Pain: ASA14B - Patient did NOT experience pain >= 7 out of 10

## 2021-06-16 PROBLEM — J36 PERITONSILLAR ABSCESS: Status: ACTIVE | Noted: 2018-01-03

## 2021-06-16 PROBLEM — F15.20 METHAMPHETAMINE USE DISORDER, MODERATE (H): Status: ACTIVE | Noted: 2019-09-19

## 2021-06-16 PROBLEM — Z20.2 CHLAMYDIA CONTACT: Status: ACTIVE | Noted: 2019-05-22

## 2021-06-16 PROBLEM — Z20.2 TRICHOMONAS CONTACT: Status: ACTIVE | Noted: 2019-05-22

## 2021-06-24 NOTE — PROGRESS NOTES
"SUBJECTIVE  Virginia Tatum is a 28 y.o. male here for:    Chief Complaint   Patient presents with     Follow-up     hospital     History of recurrent left peritonsillar abscess: recently hospitalized. He declined I&D by ENT in the hospital but did get antibiotics and is taking augmentin two times a day- has not yet completed course. He is tolerating medication well and denies any further pain. Tolerating diet. No fevers. He was told to f/u with ENT provider to discuss possible tonsillectomy but he did not schedule this yet and he would like to schedule this appointment to further discuss. He was initially concerned about surgery and the risks but he does not want to keep having these infections and wants to discuss surgical option. He lives at home with his family- has two year old child. He is not currently working.     ROS  Complete 10 point review of systems negative except as noted above in HPI    Reviewed Past Medical History, Medications, Family History and Social History in Epic and up to date with no new changes.    OBJECTIVE  /80   Pulse (!) 113   Temp 97.5  F (36.4  C) (Oral)   Resp 20   Ht 5' 6\" (1.676 m)   Wt 125 lb (56.7 kg)   SpO2 99%   BMI 20.18 kg/m       General: Cooperative, pleasant, in no acute distress  HEENT: NCAT, 2+ tonsils with tonsil stone in left tonsil. No exudates or purulent drainage. No abscess noted. Midline uvula.   Neck: Cervical lymphadenopathy  CV: RRR, normal S1/S2, no murmur, rubs, gallops  Resp: No respiratory distress. Clear to auscultation bilaterally. No wheezes, rales, rhonchi    LABS & IMAGES   Results for orders placed or performed during the hospital encounter of 02/13/19   Basic Metabolic Panel   Result Value Ref Range    Sodium 138 136 - 145 mmol/L    Potassium 4.0 3.5 - 5.0 mmol/L    Chloride 100 98 - 107 mmol/L    CO2 29 22 - 31 mmol/L    Anion Gap, Calculation 9 5 - 18 mmol/L    Glucose 89 70 - 125 mg/dL    Calcium 9.8 8.5 - 10.5 mg/dL    BUN 12 8 - 22 mg/dL "    Creatinine 0.93 0.70 - 1.30 mg/dL    GFR MDRD Af Amer >60 >60 mL/min/1.73m2    GFR MDRD Non Af Amer >60 >60 mL/min/1.73m2   Blood Culture 1st   Result Value Ref Range    Anaerobic Blood Culture Bottle No Growth No Growth, No organisms seen, bottle returned to instrument, Specimen not received    Aerobic Blood Culture Bottle No Growth No Growth, No organisms seen, bottle returned to instrument, Specimen not received   Blood Culture 2nd   Result Value Ref Range    Anaerobic Blood Culture Bottle No Growth No Growth, No organisms seen, bottle returned to instrument, Specimen not received    Aerobic Blood Culture Bottle No Growth No Growth, No organisms seen, bottle returned to instrument, Specimen not received   Lactic Acid   Result Value Ref Range    Lactic Acid 0.9 0.5 - 2.2 mmol/L   HM1 (CBC with Diff)   Result Value Ref Range    WBC 14.4 (H) 4.0 - 11.0 thou/uL    RBC 5.29 4.40 - 6.20 mill/uL    Hemoglobin 14.8 14.0 - 18.0 g/dL    Hematocrit 43.6 40.0 - 54.0 %    MCV 82 80 - 100 fL    MCH 28.0 27.0 - 34.0 pg    MCHC 33.9 32.0 - 36.0 g/dL    RDW 12.9 11.0 - 14.5 %    Platelets 272 140 - 440 thou/uL    MPV 8.7 8.5 - 12.5 fL    Neutrophils % 77 (H) 50 - 70 %    Lymphocytes % 15 (L) 20 - 40 %    Monocytes % 6 2 - 10 %    Eosinophils % 2 0 - 6 %    Basophils % 0 0 - 2 %    Neutrophils Absolute 11.1 (H) 2.0 - 7.7 thou/uL    Lymphocytes Absolute 2.2 0.8 - 4.4 thou/uL    Monocytes Absolute 0.8 0.0 - 0.9 thou/uL    Eosinophils Absolute 0.2 0.0 - 0.4 thou/uL    Basophils Absolute 0.0 0.0 - 0.2 thou/uL   Hemogram with PLT   Result Value Ref Range    WBC 14.7 (H) 4.0 - 11.0 thou/uL    RBC 4.87 4.40 - 6.20 mill/uL    Hemoglobin 13.6 (L) 14.0 - 18.0 g/dL    Hematocrit 40.4 40.0 - 54.0 %    MCV 83 80 - 100 fL    MCH 27.9 27.0 - 34.0 pg    MCHC 33.7 32.0 - 36.0 g/dL    RDW 13.0 11.0 - 14.5 %    Platelets 181 140 - 440 thou/uL    MPV 9.3 8.5 - 12.5 fL   HM1 (CBC with Diff)   Result Value Ref Range    WBC 14.1 (H) 4.0 - 11.0  thou/uL    RBC 4.91 4.40 - 6.20 mill/uL    Hemoglobin 13.6 (L) 14.0 - 18.0 g/dL    Hematocrit 40.8 40.0 - 54.0 %    MCV 83 80 - 100 fL    MCH 27.7 27.0 - 34.0 pg    MCHC 33.3 32.0 - 36.0 g/dL    RDW 13.2 11.0 - 14.5 %    Platelets 197 140 - 440 thou/uL    MPV 9.3 8.5 - 12.5 fL    Neutrophils % 73 (H) 50 - 70 %    Lymphocytes % 20 20 - 40 %    Monocytes % 6 2 - 10 %    Eosinophils % 1 0 - 6 %    Basophils % 0 0 - 2 %    Neutrophils Absolute 10.2 (H) 2.0 - 7.7 thou/uL    Lymphocytes Absolute 2.8 0.8 - 4.4 thou/uL    Monocytes Absolute 0.9 0.0 - 0.9 thou/uL    Eosinophils Absolute 0.2 0.0 - 0.4 thou/uL    Basophils Absolute 0.0 0.0 - 0.2 thou/uL   C-Reactive Protein(CRP)   Result Value Ref Range    CRP 2.8 (H) 0.0 - 0.8 mg/dL         ASSESSMENT/PLAN:   Virginia was seen today for follow-up.    Diagnoses and all orders for this visit:    Hospital discharge follow-up: Reviewed all notes, labs, imaging and follow-up recommendations.     History of peritonsillar abscess: Recurrent, left sided and is currently completing his course of augmentin and tolerating well. Afebrile and appears well. On exam, still with tonsilar enlargement but no signs of recurrence of peritonsilar abscess. Discussed risks of recurrence and he had been hesitant to consider tonsillectomy but we discussed the basics of the procedure today and he is willing to see ENT in clinic to further discuss. Set up appointment for patient today with ENT.  -     Ambulatory referral to ENT    Need for immunization against influenza  -     Influenza, Seasonal Quad, Preservative Free 36+ Months        Visit was completed along with Elva ott    Options for treatment and follow-up care were reviewed with the patient. Pah T Rc and/or guardian was engaged and actively involved in the decision making process. Pah T Rc and/or guardian verbalized understanding of the options discussed and was satisfied with the final plan.      Leona Samuel MD

## 2021-06-24 NOTE — PROGRESS NOTES
Hospital discharge follow up call to pt, no answer.  Left VM message reminding pt of appt on 2/22 with Dr. Samuel. RN will attempt call back at another time.

## 2021-06-24 NOTE — PROGRESS NOTES
TCM DISCHARGE FOLLOW UP CALL    Discharge Date:  2/15/2019  Reason for hospital stay (discharge diagnosis)::  (L) peritonsilar abscess  Are you feeling better, the same or worse since your discharge?:  Patient is feeling better (Swallowing OK, less pain. Pt wasn't home during call.)  Do you feel like you have a plan in the event of a health emergency?: Yes (would call the clinic.)    As part of your discharge plan, were  home care services ordered for you?: No    Did you receive any new medications, or was there a change to your medications?: Yes    Are you taking those medications, or do you have any established regiment?:  Pt is taking abx but she doesn't know how often.  Do you have any follow up visits scheduled with your PCP or Specialist?:  Yes, with PCP (2/22)  (RN) Is PCP appt scheduled soon enough (within 14 days of discharge date)?: Yes    RN NOTES::  Reminded pt's wife pt needs to drink extra fluids.

## 2021-07-04 NOTE — PROGRESS NOTES
Rule 31 by Noah Fierro LADC at 2019 10:30 AM     Author: Noah Fierro LADC Service: -- Author Type: Licensed Alcohol and Drug Counselor    Filed: 2019 12:17 PM Encounter Date: 2019 Status: Signed    : Noah Fierro LADC (Licensed Alcohol and Drug Counselor)         [facility] Assessment   Date: 2019        : ALIA Zamarripa    Name: Virginia Tatum  Address: [address] [place] [address]  Phone: [phone] (home)   Referral Source: K.O.M./PROBATION  : 1990  Age: 29 y.o.  Race/Ethnicity:   Marital Status: single  Employment: Not Currently                                                                                                                       Level of Education: 9th Grade, in [employer]   Socio-economic (yearly Income) Status: n/a  Sexual Orientation: identifies as a heterosexual   Last 4 digits of Social Security: 8712    Is assistance required in the ability to read and understand written material?   yes -     Reason for seeking services:  On Probation for Drug Possession, required to get an assessment, recommended for treatment.    Dimension I Acute intoxication/Withdrawal Potential:    Symptomology (past 12 months, check all that apply)  weekly intoxication, secretive use, using alone and repeated family or social problems    Observed or reported (withdrawal symptoms, check all that apply)  Agitation, Nausea/vomiting, Unable to sleep    Chemical use most recent 12 months outside a facility and other significant use history (client self-report)  Primary Drug Used  Age of First Use  Most Recent Pattern of Use and Duration    Date of last use  Time if substance use in the last 30 days Withdrawal Potential? Requiring special care  Method of use   (oral, smoked, snort, IV, etc)    Alcohol  24 1 or 2 cans, 1 or 2x per week. 1 month ago  No Oral   Marijuana/Hashish          Cocaine/Crack          Meth/Amphetamines   Client denied  any history of use, but has legal history that includes meth possession charges.       Heroin          Other Opiates/Synthetics          Inhalants          Benzodiazepines          Hallucinogens          Barbiturates/Sedatives/Hypnotics          Over-the-Counter Drugs          Other          Nicotine  27 2-3 cigarettes per day today  No Smoke       Dimension I Risk Ratin  Reason Risk Rating Assigned: Client did not provide a last date of use of alcohol, stating he could not remember. Client denied any history of methamphetamine use, but collateral sources indicate the client has a legal history of meth possession, including a pending felony possession charge. No signs or symptoms of intoxication or withdrawal noted or reported.      Dimension II Biomedical Conditions:    Any known health conditions: No    Ever previously treated/diagnosed with any eating disorder?  no     List Health Concerns/Conditions Reported: NONE    Does patient indicate awareness of any association between substance use and listed health concerns/conditions? No    Physical/Health Conditions which are associated with substance use: NONE    Are Health Concerns/Conditions being treated? YES  By Whom? Leona Samuel MD     Patient Self-Reported Medications:  Medication Dosage Frequency   NONE                                     Are you pregnant: NA OB care received:NA CPS call needed: NA    Dimension II Risk Ratin  Reason Risk Rating Assigned: The client reports he has no medical needs unmet and is able to obtain any necessary care on his own.         Dimension III Emotional/Behavioral/Cognitive:    Oriented to person, place, time, situation?  No     Current Mental Health Services: no    Past Hospitalization for MH or psychiatric problems: No    How many Hospitalizations: 0   Last Hospitalization; date and location: n/a      Past or Current Issues with Gambling (Explain): no    Prior Treatment for Gambling: No     MH Diagnoses:     NONE  Psychiatrist: NONE     Clinic: NONE      Current Psychotropic Medications:  NONE    Taking medications as prescribed:  No   Medications Helpful: NA    Current Suicidal Ideation: No  If yes, any plan? NA What is plan?:   N/A    Previous Suicide Attempts?  No   Explain: N/A     Current Homicidal Ideation: No  If yes, any plan? NA  What is plan?: N/A    Previous Homicide Attempts? No Explain: N/A    Suicidal/Homicidal Ideation in last 30 days? No  Explain: N/A     Hazardous behavior engaged in which placed self or others in danger (i.e., operating a motor vehicle, unsafe sex, sharing needles, etc.)?  None    Family history of substance and/or mental health diagnosis/issues?  No  Explain: n/a     History of abuse (Physical, Emotional, Sexual)? No  Explain: NONE       Dimension III Risk Ratin  Reason Risk Rating Assigned: No emotional or mental health problems noted or reported.       Dimension IV Readiness to Change:    Mandated, or coerced into assessment or treatment:  Yes    Does client feel there is a problem:   No    Verbalization of need/desire to change:   No     Willing to follow treatment recommendations: Yes     Impression of : (Check all that apply):    cooperative, ambivalent about change, minimal awareness and low motivation    Are there any spiritual, cultural, or other special needs to be addressed for client to be successful in treatment? no      Dimension IV Risk Ratin  Reason Risk Rating Assigned: Client does not identify with having a substance use problem but did express willingness to attend treatment. Client provided information during his assessment interview that was markedly different from information obtained from collateral sources. The Client appears to lack insight into his substance use problems due in part to cultural barriers that interfere with his understanding of the need for treatment.      Dimension V Relapse/Continued Use/Continued Problem Potential     Client  age at First Treatment:  N/A  Lifetime # of CD Treatments:  0  List program, dates, and status of completion (within last five years): N/A  Longest Period of Abstinence: 1 MONTH (CURRENT)  How did you accomplish this? Just did not drink.     Circumstances which led to Relapse: N/A    Risk Taking/Problem Behaviors Related to Use and/or Under the Influence: Denies    Dimension V Risk Rating: 3  Reason Risk Rating Assigned: The client did not demonstrate any awareness of situations or emotions that trigger his substance use or display any knowledge or understanding of the skills or coping mechanisms necessary to avoid those triggers. The Client lacks awareness and/or understanding of western concepts regarding substance use disorders due to language and cultural barriers.      Dimension VI Recovery Environment   Family support:  Yes  Peer Sober Support:  No    Current living circumstances:  Live with Girlfriend and Daughter    Environment supportive of recovery:  No    Specific activities participating in which do not involve substance use: Work    Specific activities participating in which do involve substance use: Hanging Out With Friends.    People, things that threaten recovery: no    Expected family involvement during treatment services:  n/a    Current Legal Involvement:  Probation [facility]ession in [facility],    Legal Consequences related to use: Possession charges (2, including felony).    Occupational/Academic consequences related to use: None  Current ability to function in a work and/or education setting: No Impairments Noted  Current support network for recovery (including community-based recovery support): None  Do you belong to a [place]: No Which [place]? N/A  Reside on reservation: No     Dimension VI Risk Rating: 3 Reason Risk Rating Assigned: The client stated he is Not Currently Employed or in any training program. The client is not currently attending any community based recovery support groups and  did not identify any reliable means of recovery support. Client has a violation hearing pending related to current probation for drug possession in [facility] and has charges pending in [facility] for Prostitution and Felony Drug Possession.    DSM-V Criteria for Substance Abuse  Instructions:  Determine whether the client currently meets the criteria for a Substance Use Disorder using the diagnostic criteria in the  DSM-V, pp. 481-589. Current means during the most recent 12 months outside a facility that controls access to substances.    Category of substance Severity ICD-10 Code/DSM V Code  Alcohol Use Disorder Mild  Moderate  Severe (F10.10) (305.00)  (F10.20) (303.90)  (F10.20) (303.90)   Cannabis Use Disorder Mild  Moderate  Severe (F12.10) (305.20)  (F12.20) (304.30)  (F12.20) (304.30)   Hallucinogen Use Disorder Mild  Moderate  Severe (F16.10) (305.30)  (F16.20) (304.50)  (F16.20) (304.50)   Inhalant Use Disorder Mild  Moderate  Severe (F18.10) (305.90)  (F18.20) (304.60)  (F18.20) (304.60)   Opioid Use Disorder Mild  Moderate  Severe (F11.10) (305.50)  (F11.20) (304.00)  (F11.20) (304.00)   Sedative, Hypnotic, or Anxiolytic Use Disorder Mild  Moderate  Severe (F13.10) (305.40)  (F13.20) (304.10)  (F13.20) (304.10)   Stimulant Related Disorders Mild              Moderate              Severe   (F15.10) (305.70) Amphetamine type substance  (F14.10) (305.60) Cocaine  (F15.10) (305.70) Other or unspecified stimulant    (F15.20) (304.40) Amphetamine type substance  (F14.20) (304.20) Cocaine  (F15.20) (304.40) Other or unspecified stimulant    (F15.20) (304.40) Amphetamine type substance  (F14.20) (304.20) Cocaine  (F15.20) (304.40) Other or unspecified stimulant   DisorderTobacco use Disorder Mild  Moderate  Severe (Z72.0) (305.1)  (F17.200) (305.1)  (F17.200) (305.1)   Other (or unknown) Substance Use Disorder Mild  Moderate  Severe (F19.10) (305.90)  (F19.20) (304.90)  (F19.20) (304.90)      Diagnostic Impression: Stimulant Use Disorder, moderate amphetamine type substance (F15.20)    Assessment Completed Within 3 Sessions of Admission: Yes  If NO, date assessment to be completed noted in Treatment Plan: No      Signature of Counselor: ALIA Zamarripa  Date and Time of Signature: 9/24/2019 , 12:17 PM

## 2024-12-20 ENCOUNTER — TELEPHONE (OUTPATIENT)
Dept: BEHAVIORAL HEALTH | Facility: CLINIC | Age: 34
End: 2024-12-20

## 2024-12-20 NOTE — TELEPHONE ENCOUNTER
Writer received [place] assessment from Mary Rogers at [facility]. [phone]. Faxed to [facility] for scanning.    Pt is placed on LP wait list pending insurance verification.

## 2024-12-24 NOTE — TELEPHONE ENCOUNTER
Writer contacted pt with [facility] to offer LP bed. Left VM with call back number.    Writer also emailed Mary Rogers and her supervisor Ganesh Dietz offering LP bed to pt on 12/31 at 12 pm.     Awaiting response.

## 2024-12-26 ENCOUNTER — TELEPHONE (OUTPATIENT)
Dept: BEHAVIORAL HEALTH | Facility: CLINIC | Age: 34
End: 2024-12-26

## 2024-12-26 NOTE — TELEPHONE ENCOUNTER
"Pt is a(n) adult (18+ out of HS) Seeking as eval for Adult [place] Assessment for primary substance use treatment (OP [place] programs including Co-occurring).  Appointment scheduled by:  Patient.  (self-pay - complete Cost Estimate)       needed?  NO    Contact information verified/updated: Yes    If appt is for adult [place] program location, confirm you have verified the location and address with the patient referring to the template header.  Yes    Yamileth Hutchison    \"We have scheduled your evaluation. In the event that your insurance coverage comes back as out of network, you may receive a call to cancel your appointment and direct you to your insurance company for in-network coverage.\"    Disclaimer regarding insurance read to patient?  Yes  Informed patient [facility] are for programming that is in person in the [place] area?  Yes - proceed with scheduling      "

## 2024-12-30 ENCOUNTER — HOSPITAL ENCOUNTER (OUTPATIENT)
Dept: BEHAVIORAL HEALTH | Facility: CLINIC | Age: 34
Discharge: HOME | End: 2024-12-30
Attending: PSYCHIATRY & NEUROLOGY | Admitting: PSYCHIATRY & NEUROLOGY
Payer: MEDICAID

## 2024-12-30 ENCOUNTER — DOCUMENTATION ONLY (OUTPATIENT)
Dept: ADDICTION MEDICINE | Facility: HOSPITAL | Age: 34
End: 2024-12-30

## 2024-12-30 PROCEDURE — H0001 ALCOHOL AND/OR DRUG ASSESS: HCPCS

## 2024-12-30 ASSESSMENT — COLUMBIA-SUICIDE SEVERITY RATING SCALE - C-SSRS
1. HAVE YOU WISHED YOU WERE DEAD OR WISHED YOU COULD GO TO SLEEP AND NOT WAKE UP?: NO
2. HAVE YOU ACTUALLY HAD ANY THOUGHTS OF KILLING YOURSELF?: NO
TOTAL  NUMBER OF INTERRUPTED ATTEMPTS LIFETIME: NO
ATTEMPT LIFETIME: NO
TOTAL  NUMBER OF ABORTED OR SELF INTERRUPTED ATTEMPTS LIFETIME: NO
6. HAVE YOU EVER DONE ANYTHING, STARTED TO DO ANYTHING, OR PREPARED TO DO ANYTHING TO END YOUR LIFE?: NO

## 2024-12-30 ASSESSMENT — PATIENT HEALTH QUESTIONNAIRE - PHQ9: SUM OF ALL RESPONSES TO PHQ QUESTIONS 1-9: 0

## 2024-12-30 ASSESSMENT — ANXIETY QUESTIONNAIRES
5. BEING SO RESTLESS THAT IT IS HARD TO SIT STILL: NOT AT ALL
GAD7 TOTAL SCORE: 0
4. TROUBLE RELAXING: NOT AT ALL
3. WORRYING TOO MUCH ABOUT DIFFERENT THINGS: NOT AT ALL
2. NOT BEING ABLE TO STOP OR CONTROL WORRYING: NOT AT ALL
6. BECOMING EASILY ANNOYED OR IRRITABLE: NOT AT ALL
7. FEELING AFRAID AS IF SOMETHING AWFUL MIGHT HAPPEN: NOT AT ALL
GAD7 TOTAL SCORE: 0
1. FEELING NERVOUS, ANXIOUS, OR ON EDGE: NOT AT ALL

## 2024-12-30 NOTE — TELEPHONE ENCOUNTER
Writer contacted pt to offer LP bed. Left VM with call back number. Pt speaks English but prefers to have a Elva  for treatment as Elva is his first language.

## 2024-12-30 NOTE — PROGRESS NOTES
"[place] COMPREHENSIVE ASSESSMENT     START TIME: 1:30 pm  END TIME:  3:00 pm   Duration: 1 hour 30 min     Data:  This writer met with the patient on this date for an individual session focused on completing a [place] assessment. Patient signed SURENDRA's to [facility] Utilization Review team, health insurance company (are), emergency contact (Francis Lopez/brother) and collateral information providers (Dra Domingo/friend, Shazia PICKERING at [facility]). Patient provided information on biopsychosocial backgrounds and how that impact substance use. Patient answered the following screenings: Global Appraisal of Individual Needs Short Screener (GAIN-SS), Patient Health Questionnaire and General Anxiety Disorder (PHQ-9 and BC-7), Patient Reported Outcomes Measurement Information System (PROMIS GLOBAl-10), Clinch Suicide Severity Rating Scale (C-SSRS) Lifetime, and CAGE-AID.      Intervention: Pt was educated and informed on the purpose of assessment and how it typically used to recommend for [place] treatment.    Assessment: Patient was cooperative throughout the assessment. Pt requested to be placed into an inpatient treatment or residential treatment. Pt specifically said he does not want any type of outpatient at this time because he knows he would use if he were to stay home. Pt said \"I would stop by somewhere else on the way home\".  Pt said his first language is Elva but he speaks English. Pt said he prefers having Elva interpreters in treatment, but he can also do without it as well.  Patient was seen reading and following PHQ-9 and BC-7 questions and asked more clarification when needed.       Plan. [facility] was recommended. Assessment completed and patient was notified of LP referral.     Roxana Lo MA, [facility]  12/30/2024  4pm                    Adult Substance Use Assessment Discharge Instructions     Summary: Patient was seen today to completed a Substance use Assessment through [facility]. If you " have any questions regarding your assessment or recommendations please contact your substance  Roxana ROJO at [phone] or [email or web address] to address your questions or concerns.       Recommendation:    Patient request to be referred to [facility].      Referrals: Patient was referred to YAZAN ROJO 12/30/2024  4 pm

## 2024-12-30 NOTE — TELEPHONE ENCOUNTER
Writer contacted pt with [facility] . Pt has a [place] Eval appointment this afternoon, but one is not needed as pt has a valid [place] Assessment. Left  with call back number.

## 2024-12-30 NOTE — PROGRESS NOTES
"    [facility]         PATIENT'S NAME: Virginia Tatum  PREFERRED NAME: Virginia  PRONOUNS: he/him/his      MRN: [id number]  : 1990  ADDRESS: [address] [place] [address]  ACCT. NUMBER:  [id number]  DATE OF SERVICE: 24  START TIME: 1:30 pm  END TIME: 3 pm   PREFERRED PHONE: [phone]  May we leave a program related message: Yes  EMERGENCY CONTACT: was obtained brother .  SERVICE MODALITY:  In-person    UNIVERSAL ADULT Substance Use Disorder DIAGNOSTIC ASSESSMENT    Identifying Information:  Patient is a 34 year old, [place]va   individual.  Patient was referred for an assessment by  .  Patient attended the session alone.    Chief Complaint:   The reason for seeking services at this time is: \"I don't want to go to half-way. burglary in  and working with [employer] [facility]. Pt states he attempted to steal a copper wire from street lights and got arrested for burglary. Pt stats it was my first time and the last time,I would never do it again.  Failed drug test 2 times and Shazia the CM at [employer] [facility] told me to get assessment done.\"The problem(s) began meth use started 2-3 years ago. Patient has attempted to resolve these concerns in the past through [facility] OP in  .  Patient does not appear to be in severe withdrawal, an imminent safety risk to self or others, or requiring immediate medical attention and may proceed with the assessment interview. \"Too many people are around me, and inpatient is better for me.  I want to stop.\"     Social/Family History:  Patient reported they grew up in [place].  Came to [place] in  when I was 19 or 20.  They were raised by biological parents.  They live in [place] now.  I came early to [place] and they arrived later.    Patient reported that their childhood was \"lived in a refugee camp for over 10 years.  Did not seen any violence because I stayed in the camp.  The camp was safe.  " "Patient describes current relationships with family of origin as \"good\".      The patient describes their cultural background as [place].  Cultural influences and impact on patient's life structure, values, norms, and healthcare: Racial or Ethnic Self-Identification [place], Immigration History and Status: [place] Permanent resident, Level of Acculturation: partially assimilated, and Verbal / Non-verbal Communication Style: Pt's first language is Elva .  Contextual influences on patient's health include: Contextual Factors: Individual Factors -legal problem .  Patient identified their preferred language to be English and [place]. Patient reported he prefers  if it is available but he can  understand English.  Patient reports he is involved in community of roxy activities, Roman Catholic.  He does not report any belief in spirituality.     Patient reported had no significant delays in developmental tasks.   Patient's highest education level was high school graduate.  Pt states he never went through schooling in [place] and graduated high school in [facility] and immigrated to [place] at age 19 or 20.  Patient identified the following learning problems: none reported.  Pt states he may have some trouble with English reading.    Patient reported the following relationship history 1.  Patient's current relationship status is single for life.   Patient identified their sexual orientation as heterosexual.  Patient reported having one child(domitila). 8 year old daughter.  She lives with her mom in [place].  \"Anytime I have time I go see her\"     Patient's current living/housing situation involves staying with his older brother's family  in a duplex .  They live with older brother, his wife, three kids  and he reports that housing is stable. Patient identified parents, siblings, friends, and  as part of their support system.  Patient identified the quality of these relationships as good.      Patient reports engaging in the " following recreational/leisure activities: go to gym, in summer time play soccer outside.  Patient reports the following people are supportive of recovery: brothers, parents, friends.  Patient is currently employed full time and reports they are able to function appropriately at work.. 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  Patient reports their income is obtained through employment.  Patient does not identify finances as a current stressor.  Cultural and socioeconomic factors do not affect the patient's access to services.      Patient reports the following substance related arrests or legal issues: burglary in 2023 .   Pt states he did not break into people's house or became violent toward anyone. Pt said he attempted to steal copper wire form a street lamp and got arrested.  Patient denies being on probation / parole / under the jurisdiction of the court and working with [employer].     Patient's Strengths and Limitations:  Patient identified the following strengths or resources that will help them succeed in treatment: , exercise routine, friends / good social support, family support, motivation, and work ethic. Things that may interfere with the patient's success in treatment include: none identified.     Assessments:  The following assessments were completed by patient for this visit:  PHQ9:       12/30/2024     1:00 PM   PHQ-9 SCORE   PHQ-9 Total Score 0     GAD7:       12/30/2024     1:00 PM   BC-7 SCORE   Total Score 0     CAGE-AID:       12/30/2024     1:00 PM   CAGE-AID Total Score   Total Score 2     PROMIS 10-Global Health (all questions and answers displayed):       12/30/2024     1:00 PM   PROMIS 10   In general, would you say your health is: 3   In general, would you say your quality of life is: 4   In general, how would you rate your physical health? 3   In general, how would you rate your mental health, including your mood and your ability  to think? 4   In general, how would you rate your satisfaction with your social activities and relationships? 3   In general, please rate how well you carry out your usual social activities and roles. (This includes activities at home, at work and in your community, and responsibilities as a parent, child, spouse, employee, friend, etc.) 4   To what extent are you able to carry out your everyday physical activities such as walking, climbing stairs, carrying groceries, or moving a chair? 5   In the past 7 days, how often have you been bothered by emotional problems such as feeling anxious, depressed, or irritable? 1   In the past 7 days, how would you rate your fatigue on average? 1   In the past 7 days, how would you rate your pain on average, where 0 means no pain, and 10 means worst imaginable pain? 0   Global Mental Health Score 16   Global Physical Health Score 18   PROMIS TOTAL - SUBSCORES 34     [place]ster Suicide Severity Rating Scale (Lifetime/Recent)      12/30/2024     1:00 PM   [place] Suicide Severity Rating (Lifetime/Recent)   Q1 Wish to be Dead (Lifetime) N   Q2 Non-Specific Active Suicidal Thoughts (Lifetime) N   Actual Attempt (Lifetime) N   Has subject engaged in non-suicidal self-injurious behavior? (Lifetime) N   Interrupted Attempts (Lifetime) N   Aborted or Self-Interrupted Attempt (Lifetime) N   Preparatory Acts or Behavior (Lifetime) N   Calculated C-SSRS Risk Score (Lifetime/Recent) No Risk Indicated     GAIN-sliding scale:      12/30/2024     1:00 PM   When was the last time that you had significant problems...   with feeling very trapped, lonely, sad, blue, depressed or hopeless about the future? Never   with sleep trouble, such as bad dreams, sleeping restlessly, or falling asleep during the day? Never   with feeling very anxious, nervous, tense, scared, panicked or like something bad was going to happen? Never   with becoming very distressed & upset when something reminded you of the past?  Never   with thinking about ending your life or committing suicide? Never          12/30/2024     1:00 PM   When was the last time that you did the following things 2 or more times?   Lied or conned to get things you wanted or to avoid having to do something? Never   Had a hard time paying attention at school, work or home? Never   Had a hard time listening to instructions at school, work or home? Never   Were a bully or threatened other people? Never   Started physical fights with other people? Never       Personal and Family Medical History:  Patient did not report a family history of mental health concerns.  Patient reports family history includes No Known Problems in his father and mother..      Patient reported the following previous mental health diagnoses: none.  Patient reports their primary mental health symptoms include:  none.   Patient has not received mental health services in the past: .  Psychiatric Hospitalizations: .  Patient .  Current mental health services/providers include:  none.    Patient has not had a physical exam to rule out medical causes for current symptoms.  Date of last physical exam was greater than a year ago and client was encouraged to schedule an exam with PCP. The patient has a non-[facility] Primary Care Provider. Their PCP is [facility] .Kaveh Zazueta MD.  Patient reports no current medical and/or dental concerns.  Patient denies any issues with pain..  There are not significant appetite / nutritional concerns / weight changes.  Patient does not report a history of an eating disorder.  Patient does not report a history of head injury / trauma / cognitive impairment.      No current outpatient medications on file.     No current facility-administered medications for this encounter.         Medication Adherence:  Patient reports  not on any medications. Patient reports he has not seen primary care physician for over a year and never had mental health  "services .  Patient is able to self-administer medications.      Patient Allergies:  No Known Allergies    Medical History:  No past medical history on file.      Substance Use:   Patient reported the following biological family members or relatives with chemical health issues:  younger brother-meth and went through inpatient tx and now sober..  Patient has not received substance use disorder and/or gambling treatment in the past.  Patient has not ever been to detox.  Patient is not currently receiving any chemical dependency treatment. Patient reports no history of support group attendance.        Substance Age of first use Pattern and duration of use (include amounts and frequency) Date of last use     Withdrawal potential Route of administration   has used Alcohol Age 19 or 20 \"When I came to [place] I started drinking beer\"  1 year ago No oral   has used Marijuana   Age 19 or 20  After I came to [place], not much only couple hits  2015 No smoked     has used Amphetamines   Started using in 2021 Smoked too much. Daily use. Throughout day.  Cannot stop on my own. $5-$10 a day.     2 weeks ago (12/16/2024)  No smoked   has not used Cocaine/crack    Denies        has not used Hallucinogens Denies        has not used Inhalants Denies        has not used Heroin Denies        has not used Other Opiates Denies        has not used Benzodiazepine   Denies        has not used Barbiturates Denies        has not used Over the counter meds. Denies        has not use Caffeine Denies        has used Nicotine  2015  1-2 cigarettes a day  12/30/24 No smoked   has not used other substances not listed above:  Identify:  Denies            Patient reported the following problems as a result of their substance use: family problems, financial problems, and legal issues.  Patient is concerned about substance use. Patient reports parents, brother is concerned about their substance use.  Pt states his family do not like him using drugs. Patient " "reports they obtain substances by friends.  Patient reports their recovery goals are \"stop using and eventually moved to [place] where my daughter lives.\"     Patient reports experiencing the following withdrawal symptoms within the past 12 months: unable to sleep, fatigue, and muscle aches and the following within the past 30 days: unable to sleep, fatigue, and muscle aches.   Patients reports urges to use Methamphetamine.  Patient reports he has used more Methamphetamine than intended and over a longer period of time than intended. Patient reports he has had unsuccessful attempts to cut down or control use of Methamphetamine.  Patient reports longest period of abstinence was \"one week\" and return to use was due to \"my body needed it. Body felt heavy without meth\" . Patient reports he has needed to use more Methamphetamine to achieve the same effect.  Patient does  report diminished effect with use of same amount of Methamphetamine.     Patient does  report a great deal of time is spent in activities necessary to obtain, use, or recover from Methamphetamine effects.  Patient does  report important social, occupational, or recreational activities are given up or reduced because of Methamphetamine use.  Patient denies Methamphetamine use was continued despite knowledge of having a persistent or recurrent physical or psychological problem that is likely to have caused or exacerbated by use.     Patient reports substance use has not ever impacted their ability to function in a school setting. Patient reports substance use has ever impacted their ability to function in a work setting.  Patients demographics and history impact their recovery in the following ways: Pt reports amphetamine use is prevalent in his community and he needs to get into treatment to stay away. Pt states he would use if he were to attend outpatient treatment.  Pt said he would stop somewhere else on the way home and use and did not trust his " ability to stay sober without going into a inpatient or residential treatment.  Patient reports engaging in the following recreation/leisure activities while using: none.  Patient reports the following people are supportive of recovery: brothers, parents, family, some friends.     Patient reports a  history of gambling concerns and/or treatment. Pt states he used to play cards with dice, but he is no longer engaging in gambling  Patient does not have other addictive behaviors he is concerned about.      Dimension Scale Ratings:    Dimension 1: 1 Client can tolerate and cope with withdrawal discomfort. The client displays mild to moderate intoxication or signs and symptoms interfering with daily functioning but does not immediately endanger self or others. Client poses minimal risk of severe withdrawal.      Summary to support rating:  Patient reports the last use of methamphetamine on 12/16/2024 ands states he experienced typical amphetamine withdrawals in the last 30 days include sleep problem, body ache, antique.   Pt did not report any other withdrawal issues and did not appear to need medical detox.      Dimension 2: 1 Client tolerates and sushil with physical discomfort and is able to get the services that the client needs.    Summary to support rating:  No medical or dental problem reported by the patient.  Pt states he plays soccer int he summertime and goes to gym.  Pt states he is tolerating his sleep problem that resulted from using.  [facility], [facility].  Pt states he has not had any physical for over a year as a result of methamphetamine use.     Dimension 3: 2 Client has difficulty with impulse control and lacks coping skills. Client has thoughts of suicide or harm to others without means; however, the thoughts may interfere with participation in some treatment activities. Client has difficulty functioning in significant life areas. Client has moderate symptoms of  emotional, behavioral, or cognitive problems. Client is able to participate in most treatment activities.  Summary to support rating: Patient reports challenge with impulse control as evidenced by attempting to steal copper wire. Pt states he never experienced suicidal or homicidal ideations.  Pt states he lived in a refugee camp in [place] for over 10  years and graduate from  high school there.  Pt denies experiencing any trauma and said the camp was a safe place. No  treatment, mental health services, or sober support experiences in the past.  He immigrated to [place] at age 19 or 20.  Patient states he experimented alcohol and marijuana but stopped using both.  Amphetamine use started in 2021 and it is prevalent in his community. Pt states it is very challenging to try to quit by outpatient treatment.  He requested to be referred to an inpatient or residential treatment where he can be away from negative environment.      Dimension 4: 0 Client is cooperative, motivated, ready to change, admits problems, committed to change, and engaged in treatment as a responsible participant.    Summary to support rating:  Pt requests he wants to go into a place and live (residential or inpatient) treatment.  Pt stats he needs to quit.      Dimension 5: 4 No awareness of the negative impact of mental health problems or substance abuse. No coping skills to arrest mental health or addiction illnesses, or prevent relapse.    Summary to support rating:  Pt states he would use if he does not go into a residential or inpatient treatment. Pt emphasized that it is not possible to quit by staying in his community because it is so prevalent. Pt said he has a young brother who got sober and he would like to get sober too.  Pt identifies his strength and future goal as to get sober and move to [place] so he can be close to his 8  year old daughter.  Daily use and high cravings reported.      Dimension 6: 3 Client is not engaged in  structured, meaningful activity and the client's peers, family, significant other, and living environment are unsupportive, or there is significant criminal justice system involvement.  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 Pt admits that he worked under the influence in the past.  Pt has supportive family and identifies some of his friends are supportive of him getting help. Pt identifies his goal is to get healthy and move to [place] where his 8 year old daughter lives. Currently lives at his older brother's.  He has [employer] involvement.      Significant Losses / Trauma / Abuse / Neglect Issues:   Patient   did not serve in the .  There are not indications or report of significant loss, trauma, abuse or neglect issues related to: none reported  Concerns for possible neglect are not present.     Safety Assessment:   Patient denies current homicidal ideation and behaviors.  Patient denies current self-injurious ideation and behaviors.    Patient reported engaging in illegal activities, such as copper wire theft  associated with substance use.   Patient reported impulsive decisionmaking reported substance use associated with mental health symptoms.  Patient reports the following current concerns for their personal safety: None.  Patient reports there   firearms in the house.       There are no firearms in the home..    History of Safety Concerns:  Patient denied a history of homicidal ideation.     Patient denied a history of personal safety concerns.    Patient denied a history of assaultive behaviors.    Patient denied a history of sexual assault behaviors.     Patient reported a history of engaging in illegal activities, such as copper wire theft  associated with substance use.  Patient reported a history of impulsive decision making reported a history of substance use associated with mental health symptoms.  Patient reports the  following protective factors: hopeful, identifies reason for living, strong bond to family unit, community, job, school, etc, lives in a responsibly safe environment, and responsibilities to others      Vulnerability Assessment:    Does the patient have a history of vulnerability such as being teased, picked on, or other indications of potential safety issues with others ?  No    Does this patient have a history of being the victim of abuse? No history of abuse reported or documented.    Does this patient have a history of victimizing others or physical/sexual aggression? No     Does the patient have a history of boundary violations?  No.    Does the patient have a history of other sexual acting out behaviors (e.g grooming)?   No    Does the patient have a history of threats to self or others? Fire setting, running away or other self-injurious behaviors?    No    Has the patient required holds or restraints to manage behavior?  No    Does the patient s history indicate the need for special precautions or particular staffing patterns in the facility?  No      NOTE: If this screening indicates that the patient is at risk to harm self or others, notify staff at referral location.    Risk Plan:  See Recommendations for Safety and Risk Management Plan    Review of Symptoms per patient report:   Substance Use:  daily use, decrease in school performance, and cravings/urges to use       Collateral Contact Summary:   Collateral contacts contributing to this assessment:        Name  Dra Domingo    Relationship    Friend  Phone Number    [phone] Releases    Yes      Information Provided:      At first, patient chose his CM Shazia/Herminia Garibay as his collateral information, however when this writer called Shazia, she was was out of office till 1/2/2025.  Patient seemed he wanted to get this assessment done quickly so he can get into a treatment. He chose his friend Dra Domingo as the second collateral.  This writer spoke with   on 12/30/2024 at 2:50 pm.   said the patient has been using meth for a while and the last one year has gotten bad.   said he needs to get into treatment.        If court related records were reviewed, summarize here:     Information from collateral contacts supported/largely agreed with information from the client and associated risk ratings.    Information in this assessment was obtained from the medical record and provided by patient who is a good historian.    Patient will have open access to their mental health medical record.    Diagnostic Criteria: 1.) Alcohol/drug is often taken in larger amounts or over a longer period than was intended.  Met for Amphetamines.  2.) There is a persistent desire or unsuccessful efforts to cut down or control alcohol/drug use.  Met for Amphetamines.  3.) A great deal of time is spent in activities necessary to obtain alcohol, use alcohol, or recover from its effects.  Met for Amphetamines.  4.) Craving, or a strong desire or urge to use alcohol/drug.  Met for Amphetamines.  5.) Recurrent alcohol/drug use resulting in a failure to fulfill major role obligations at work, school or home.  Met for Amphetamines.  6.) Continued alcohol use despite having persistent or recurrent social or interpersonal problems caused or exacerbated by the effects of alcohol/drug.  Met for Amphetamines.  7.) Important social, occupational, or recreational activities are given up or reduced because of alcohol/drug use.  Met for Amphetamines.  9.) Alcohol/drug use is continued despite knowledge of having a persistent or recurrent physical or psychological problem that is likely to have been caused or exacerbated by alcohol.  Met for Amphetamines.  10.) Tolerance, as defined by either of the following: A need for markedly increased amounts of alcohol/drug to achieve intoxication or desired effect..  Met for Amphetamines.  11.) Withdrawal, as manifested by either of the following: The  characteristic withdrawal syndrome for alcohol/drug (refer to Criteria A and B of the criteria set for alcohol/drug withdrawal).. Met for Amphetamines.       As evidenced by self report and criteria, client meets the following DSM5 Diagnoses:   (Sustained by DSM5 Criteria Listed Above)  Stimulant Use Disorder:   , Specify current severity:  Severe  304.40 (F15.20) Severe, Amphetamine type substance.    Recommendations:     1. Plan for Safety and Risk Management:  Recommended that patient call 911 or go to the local ED should there be a change in any of these risk factors.      Report to child / adult protection services was NA.     2. [place] Referrals:   Recommendations: Patient meets criteria for the following levels of care based on ASAM Criteria:  Withdrawal Management - No Withdrawal Management Indicated, Treatment/Recovery Services - 3.5 Clinically Managed Medium and High Intensity Residential Services.  Patient's placement align's with the assessment and placement level of care recommendation based on current ASAM Dimension scale ratings.   Patient reports they are willing to follow these recommendations.  Patient would like the following family or other support people involved in their treatment: none. Patient does not have a history of opiate use.    3. Mental Health Referrals:  co-occurring program      4. Clinical Substantiation/medical necessity for the above recommendations:  Pt does not report any major withdrawal issues that require medical detox. None observable.     5. Patient's identified substance use concerns with a cultural influence will be addressed by residential program referral .     6. Recommendations for treatment focus:   Alcohol / Substance Use - ASA 3.5  co-occurring program    7.  Interactive Complexity: No    8. Safety Plan:    [facility] Safety Plan      Creation Date: 12/30/24       Step 1: Warning signs:    Warning Signs    Patient denies ever experiencing SI or crisis      Step  2: Internal coping strategies - Things I can do to take my mind off my problems without contacting another person:      Step 3: People and social settings that provide distraction:         Step 4: People whom I can ask for help during a crisis:      Step 5: Professionals or agencies I can contact during a crisis:      Suicide Prevention Lifeline Phone: Call or Text 737  Crisis Text Line: Text HOME to 604977     Step 6: Making the environment safer (plan for lethal means safety):      Optional: What is most important to me and worth living for?:      [facility] Safety Plan. Karla Hays and Davin Cartwright. Used with permission of the authors.                      Provider Name/ Credentials:  [facility]  December 30, 2024  3 pm